# Patient Record
Sex: FEMALE | Race: BLACK OR AFRICAN AMERICAN | NOT HISPANIC OR LATINO | Employment: OTHER | URBAN - METROPOLITAN AREA
[De-identification: names, ages, dates, MRNs, and addresses within clinical notes are randomized per-mention and may not be internally consistent; named-entity substitution may affect disease eponyms.]

---

## 2018-05-17 PROCEDURE — 36415 COLL VENOUS BLD VENIPUNCTURE: CPT

## 2018-05-17 PROCEDURE — 99285 EMERGENCY DEPT VISIT HI MDM: CPT

## 2018-05-18 ENCOUNTER — HOSPITAL ENCOUNTER (INPATIENT)
Facility: HOSPITAL | Age: 53
LOS: 1 days | Discharge: PSYCHIATRIC OR SUBSTANCE USE FACILITY | End: 2018-05-19
Attending: EMERGENCY MEDICINE | Admitting: INTERNAL MEDICINE

## 2018-05-18 ENCOUNTER — APPOINTMENT (OUTPATIENT)
Dept: CT IMAGING | Facility: HOSPITAL | Age: 53
End: 2018-05-18
Attending: HOSPITALIST

## 2018-05-18 DIAGNOSIS — T44.7X2A: Primary | ICD-10-CM

## 2018-05-18 DIAGNOSIS — R45.851 SUICIDAL IDEATION: ICD-10-CM

## 2018-05-18 PROBLEM — I42.9 CARDIOMYOPATHY (HCC): Status: ACTIVE | Noted: 2018-05-18

## 2018-05-18 PROBLEM — F32.A DEPRESSION: Status: ACTIVE | Noted: 2018-05-18

## 2018-05-18 PROBLEM — I10 HYPERTENSION: Status: ACTIVE | Noted: 2018-05-18

## 2018-05-18 PROBLEM — E78.5 HYPERLIPIDEMIA: Status: ACTIVE | Noted: 2018-05-18

## 2018-05-18 LAB
ALBUMIN SERPL-MCNC: 4.3 G/DL (ref 3.5–5.2)
ALBUMIN/GLOB SERPL: 1 G/DL
ALP SERPL-CCNC: 87 U/L (ref 39–117)
ALT SERPL W P-5'-P-CCNC: 9 U/L (ref 1–33)
AMPHET+METHAMPHET UR QL: NEGATIVE
ANION GAP SERPL CALCULATED.3IONS-SCNC: 13.7 MMOL/L
ANION GAP SERPL CALCULATED.3IONS-SCNC: 13.8 MMOL/L
APAP SERPL-MCNC: <5 MCG/ML (ref 10–30)
APTT PPP: 39 SECONDS (ref 22.7–35.4)
APTT PPP: 51.8 SECONDS (ref 22.7–35.4)
AST SERPL-CCNC: 10 U/L (ref 1–32)
BARBITURATES UR QL SCN: NEGATIVE
BASOPHILS # BLD AUTO: 0.01 10*3/MM3 (ref 0–0.2)
BASOPHILS # BLD AUTO: 0.02 10*3/MM3 (ref 0–0.2)
BASOPHILS NFR BLD AUTO: 0.2 % (ref 0–1.5)
BASOPHILS NFR BLD AUTO: 0.3 % (ref 0–1.5)
BENZODIAZ UR QL SCN: NEGATIVE
BILIRUB SERPL-MCNC: 0.8 MG/DL (ref 0.1–1.2)
BUN BLD-MCNC: 17 MG/DL (ref 6–20)
BUN BLD-MCNC: 17 MG/DL (ref 6–20)
BUN/CREAT SERPL: 13.7 (ref 7–25)
BUN/CREAT SERPL: 15 (ref 7–25)
CALCIUM SPEC-SCNC: 9.3 MG/DL (ref 8.6–10.5)
CALCIUM SPEC-SCNC: 9.5 MG/DL (ref 8.6–10.5)
CANNABINOIDS SERPL QL: POSITIVE
CHLORIDE SERPL-SCNC: 103 MMOL/L (ref 98–107)
CHLORIDE SERPL-SCNC: 103 MMOL/L (ref 98–107)
CO2 SERPL-SCNC: 23.3 MMOL/L (ref 22–29)
CO2 SERPL-SCNC: 25.2 MMOL/L (ref 22–29)
COCAINE UR QL: POSITIVE
CREAT BLD-MCNC: 1.13 MG/DL (ref 0.57–1)
CREAT BLD-MCNC: 1.24 MG/DL (ref 0.57–1)
DEPRECATED RDW RBC AUTO: 50.4 FL (ref 37–54)
DEPRECATED RDW RBC AUTO: 50.9 FL (ref 37–54)
EOSINOPHIL # BLD AUTO: 0.24 10*3/MM3 (ref 0–0.7)
EOSINOPHIL # BLD AUTO: 0.33 10*3/MM3 (ref 0–0.7)
EOSINOPHIL NFR BLD AUTO: 3.9 % (ref 0.3–6.2)
EOSINOPHIL NFR BLD AUTO: 4.7 % (ref 0.3–6.2)
ERYTHROCYTE [DISTWIDTH] IN BLOOD BY AUTOMATED COUNT: 16.3 % (ref 11.7–13)
ERYTHROCYTE [DISTWIDTH] IN BLOOD BY AUTOMATED COUNT: 16.3 % (ref 11.7–13)
ETHANOL BLD-MCNC: <10 MG/DL (ref 0–10)
ETHANOL UR QL: <0.01 %
GFR SERPL CREATININE-BSD FRML MDRD: 55 ML/MIN/1.73
GFR SERPL CREATININE-BSD FRML MDRD: 61 ML/MIN/1.73
GLOBULIN UR ELPH-MCNC: 4.2 GM/DL
GLUCOSE BLD-MCNC: 109 MG/DL (ref 65–99)
GLUCOSE BLD-MCNC: 131 MG/DL (ref 65–99)
HCT VFR BLD AUTO: 35.2 % (ref 35.6–45.5)
HCT VFR BLD AUTO: 39.2 % (ref 35.6–45.5)
HCT VFR BLD AUTO: 42.1 % (ref 35.6–45.5)
HGB BLD-MCNC: 11.1 G/DL (ref 11.9–15.5)
HGB BLD-MCNC: 12.4 G/DL (ref 11.9–15.5)
HGB BLD-MCNC: 13.3 G/DL (ref 11.9–15.5)
IMM GRANULOCYTES # BLD: 0 10*3/MM3 (ref 0–0.03)
IMM GRANULOCYTES # BLD: 0.01 10*3/MM3 (ref 0–0.03)
IMM GRANULOCYTES NFR BLD: 0 % (ref 0–0.5)
IMM GRANULOCYTES NFR BLD: 0.1 % (ref 0–0.5)
INR PPP: 3.68 (ref 0.9–1.1)
INR PPP: 5.7 (ref 0.9–1.1)
LYMPHOCYTES # BLD AUTO: 1.93 10*3/MM3 (ref 0.9–4.8)
LYMPHOCYTES # BLD AUTO: 2.13 10*3/MM3 (ref 0.9–4.8)
LYMPHOCYTES NFR BLD AUTO: 27.7 % (ref 19.6–45.3)
LYMPHOCYTES NFR BLD AUTO: 35 % (ref 19.6–45.3)
MCH RBC QN AUTO: 26.7 PG (ref 26.9–32)
MCH RBC QN AUTO: 26.8 PG (ref 26.9–32)
MCHC RBC AUTO-ENTMCNC: 31.6 G/DL (ref 32.4–36.3)
MCHC RBC AUTO-ENTMCNC: 31.6 G/DL (ref 32.4–36.3)
MCV RBC AUTO: 84.3 FL (ref 80.5–98.2)
MCV RBC AUTO: 84.7 FL (ref 80.5–98.2)
METHADONE UR QL SCN: NEGATIVE
MONOCYTES # BLD AUTO: 0.29 10*3/MM3 (ref 0.2–1.2)
MONOCYTES # BLD AUTO: 0.36 10*3/MM3 (ref 0.2–1.2)
MONOCYTES NFR BLD AUTO: 4.8 % (ref 5–12)
MONOCYTES NFR BLD AUTO: 5.2 % (ref 5–12)
NEUTROPHILS # BLD AUTO: 3.41 10*3/MM3 (ref 1.9–8.1)
NEUTROPHILS # BLD AUTO: 4.33 10*3/MM3 (ref 1.9–8.1)
NEUTROPHILS NFR BLD AUTO: 56.1 % (ref 42.7–76)
NEUTROPHILS NFR BLD AUTO: 62.1 % (ref 42.7–76)
NRBC BLD MANUAL-RTO: 0 /100 WBC (ref 0–0)
NRBC BLD MANUAL-RTO: 0 /100 WBC (ref 0–0)
OPIATES UR QL: NEGATIVE
OXYCODONE UR QL SCN: NEGATIVE
PLATELET # BLD AUTO: 225 10*3/MM3 (ref 140–500)
PLATELET # BLD AUTO: 229 10*3/MM3 (ref 140–500)
PMV BLD AUTO: 11.8 FL (ref 6–12)
PMV BLD AUTO: 12.1 FL (ref 6–12)
POTASSIUM BLD-SCNC: 4.1 MMOL/L (ref 3.5–5.2)
POTASSIUM BLD-SCNC: 4.3 MMOL/L (ref 3.5–5.2)
PROT SERPL-MCNC: 8.5 G/DL (ref 6–8.5)
PROTHROMBIN TIME: 36 SECONDS (ref 11.7–14.2)
PROTHROMBIN TIME: 50.7 SECONDS (ref 11.7–14.2)
RBC # BLD AUTO: 4.65 10*6/MM3 (ref 3.9–5.2)
RBC # BLD AUTO: 4.97 10*6/MM3 (ref 3.9–5.2)
SALICYLATES SERPL-MCNC: 0.4 MG/DL
SODIUM BLD-SCNC: 140 MMOL/L (ref 136–145)
SODIUM BLD-SCNC: 142 MMOL/L (ref 136–145)
TROPONIN T SERPL-MCNC: <0.01 NG/ML (ref 0–0.03)
TROPONIN T SERPL-MCNC: <0.01 NG/ML (ref 0–0.03)
WBC NRBC COR # BLD: 6.08 10*3/MM3 (ref 4.5–10.7)
WBC NRBC COR # BLD: 6.97 10*3/MM3 (ref 4.5–10.7)

## 2018-05-18 PROCEDURE — 70450 CT HEAD/BRAIN W/O DYE: CPT

## 2018-05-18 PROCEDURE — 84484 ASSAY OF TROPONIN QUANT: CPT | Performed by: EMERGENCY MEDICINE

## 2018-05-18 PROCEDURE — 93005 ELECTROCARDIOGRAM TRACING: CPT | Performed by: INTERNAL MEDICINE

## 2018-05-18 PROCEDURE — 90791 PSYCH DIAGNOSTIC EVALUATION: CPT | Performed by: SOCIAL WORKER

## 2018-05-18 PROCEDURE — 85014 HEMATOCRIT: CPT | Performed by: HOSPITALIST

## 2018-05-18 PROCEDURE — 80307 DRUG TEST PRSMV CHEM ANLYZR: CPT | Performed by: EMERGENCY MEDICINE

## 2018-05-18 PROCEDURE — 93010 ELECTROCARDIOGRAM REPORT: CPT | Performed by: INTERNAL MEDICINE

## 2018-05-18 PROCEDURE — 93005 ELECTROCARDIOGRAM TRACING: CPT | Performed by: EMERGENCY MEDICINE

## 2018-05-18 PROCEDURE — 25010000002 LORAZEPAM PER 2 MG: Performed by: HOSPITALIST

## 2018-05-18 PROCEDURE — 80053 COMPREHEN METABOLIC PANEL: CPT | Performed by: EMERGENCY MEDICINE

## 2018-05-18 PROCEDURE — 85730 THROMBOPLASTIN TIME PARTIAL: CPT | Performed by: EMERGENCY MEDICINE

## 2018-05-18 PROCEDURE — 84484 ASSAY OF TROPONIN QUANT: CPT | Performed by: HOSPITALIST

## 2018-05-18 PROCEDURE — 85025 COMPLETE CBC W/AUTO DIFF WBC: CPT | Performed by: HOSPITALIST

## 2018-05-18 PROCEDURE — 85610 PROTHROMBIN TIME: CPT | Performed by: HOSPITALIST

## 2018-05-18 PROCEDURE — 85025 COMPLETE CBC W/AUTO DIFF WBC: CPT | Performed by: EMERGENCY MEDICINE

## 2018-05-18 PROCEDURE — 85610 PROTHROMBIN TIME: CPT | Performed by: EMERGENCY MEDICINE

## 2018-05-18 PROCEDURE — 36415 COLL VENOUS BLD VENIPUNCTURE: CPT

## 2018-05-18 PROCEDURE — 85730 THROMBOPLASTIN TIME PARTIAL: CPT | Performed by: HOSPITALIST

## 2018-05-18 PROCEDURE — 85018 HEMOGLOBIN: CPT | Performed by: HOSPITALIST

## 2018-05-18 RX ORDER — ACTIVATED CHARCOAL 25 G
50 SUSPENSION, RECONSTITUTED, ORAL (EA) ORAL ONCE
Status: DISCONTINUED | OUTPATIENT
Start: 2018-05-18 | End: 2018-05-19 | Stop reason: HOSPADM

## 2018-05-18 RX ORDER — QUETIAPINE FUMARATE 100 MG/1
100 TABLET, FILM COATED ORAL NIGHTLY
Status: DISCONTINUED | OUTPATIENT
Start: 2018-05-18 | End: 2018-05-19 | Stop reason: HOSPADM

## 2018-05-18 RX ORDER — SODIUM CHLORIDE 0.9 % (FLUSH) 0.9 %
10 SYRINGE (ML) INJECTION AS NEEDED
Status: DISCONTINUED | OUTPATIENT
Start: 2018-05-18 | End: 2018-05-19 | Stop reason: HOSPADM

## 2018-05-18 RX ORDER — SPIRONOLACTONE 100 MG/1
100 TABLET, FILM COATED ORAL DAILY
Status: ON HOLD | COMMUNITY
End: 2018-05-19

## 2018-05-18 RX ORDER — LISINOPRIL 10 MG/1
10 TABLET ORAL DAILY
Status: ON HOLD | COMMUNITY
End: 2018-05-20

## 2018-05-18 RX ORDER — ACETAMINOPHEN 325 MG/1
650 TABLET ORAL EVERY 4 HOURS PRN
Status: DISCONTINUED | OUTPATIENT
Start: 2018-05-18 | End: 2018-05-19 | Stop reason: HOSPADM

## 2018-05-18 RX ORDER — CARVEDILOL 25 MG/1
25 TABLET ORAL 2 TIMES DAILY WITH MEALS
COMMUNITY

## 2018-05-18 RX ORDER — QUETIAPINE FUMARATE 400 MG/1
400 TABLET, FILM COATED ORAL NIGHTLY
COMMUNITY
End: 2018-05-24 | Stop reason: HOSPADM

## 2018-05-18 RX ORDER — LORAZEPAM 2 MG/ML
0.5 INJECTION INTRAMUSCULAR EVERY 6 HOURS PRN
Status: DISCONTINUED | OUTPATIENT
Start: 2018-05-18 | End: 2018-05-19 | Stop reason: HOSPADM

## 2018-05-18 RX ORDER — SODIUM CHLORIDE 9 MG/ML
75 INJECTION, SOLUTION INTRAVENOUS CONTINUOUS
Status: DISCONTINUED | OUTPATIENT
Start: 2018-05-18 | End: 2018-05-18

## 2018-05-18 RX ORDER — SODIUM CHLORIDE 0.9 % (FLUSH) 0.9 %
1-10 SYRINGE (ML) INJECTION AS NEEDED
Status: DISCONTINUED | OUTPATIENT
Start: 2018-05-18 | End: 2018-05-19 | Stop reason: HOSPADM

## 2018-05-18 RX ORDER — ERGOCALCIFEROL (VITAMIN D2) 10 MCG
400 TABLET ORAL DAILY
COMMUNITY

## 2018-05-18 RX ORDER — LORAZEPAM 0.5 MG/1
0.5 TABLET ORAL EVERY 6 HOURS PRN
Status: DISCONTINUED | OUTPATIENT
Start: 2018-05-18 | End: 2018-05-19 | Stop reason: HOSPADM

## 2018-05-18 RX ORDER — QUETIAPINE FUMARATE 25 MG/1
25 TABLET, FILM COATED ORAL NIGHTLY
Status: DISCONTINUED | OUTPATIENT
Start: 2018-05-18 | End: 2018-05-18

## 2018-05-18 RX ORDER — FUROSEMIDE 20 MG/1
40 TABLET ORAL 2 TIMES DAILY
Status: ON HOLD | COMMUNITY
End: 2018-05-19

## 2018-05-18 RX ADMIN — SODIUM CHLORIDE 75 ML/HR: 9 INJECTION, SOLUTION INTRAVENOUS at 03:38

## 2018-05-18 RX ADMIN — QUETIAPINE FUMARATE 100 MG: 100 TABLET, FILM COATED ORAL at 22:52

## 2018-05-18 RX ADMIN — LORAZEPAM 0.5 MG: 2 INJECTION INTRAMUSCULAR; INTRAVENOUS at 03:47

## 2018-05-18 RX ADMIN — SODIUM CHLORIDE 1000 ML: 9 INJECTION, SOLUTION INTRAVENOUS at 10:50

## 2018-05-18 RX ADMIN — LORAZEPAM 0.5 MG: 0.5 TABLET ORAL at 22:52

## 2018-05-18 NOTE — PLAN OF CARE
Problem: Patient Care Overview  Goal: Plan of Care Review  Outcome: Ongoing (interventions implemented as appropriate)   05/18/18 0650   Coping/Psychosocial   Plan of Care Reviewed With patient   Plan of Care Review   Progress no change   OTHER   Outcome Summary -90SBP, HR 70-80's, pt alert and oriented, cont to need urine specimen, Suicide precautions in place, sitter at BS, Ativan x1 IV for nausea, safety maintained

## 2018-05-18 NOTE — PROGRESS NOTES
Discharge Planning Assessment   [facility]     Patient Name: Corrine Dumont  MRN: [id number]  Today's Date: 5/18/2018    Admit Date: 5/18/2018          Discharge Needs Assessment     Row Name 05/18/18 1410       Living Environment    Lives With child(chelo), adult;child(chelo), dependent;parent(s)    Current Living Arrangements home/apartment/condo    Primary Care Provided by self    Provides Primary Care For no one    Family Caregiver if Needed other (see comments)    Quality of Family Relationships unable to assess    Able to Return to Prior Arrangements other (see comments)       Resource/Environmental Concerns    Resource/Environmental Concerns none    Transportation Concerns car, none       Transition Planning    Patient/Family Anticipates Transition to other (see comments)    Patient/Family Anticipated Services at Transition mental health services    Transportation Anticipated family or friend will provide       Discharge Needs Assessment    Concerns to be Addressed physical/sexual safety;mental health;medication;discharge planning    Equipment Currently Used at Home oxygen;nebulizer    Anticipated Changes Related to Illness none    Equipment Needed After Discharge none            Discharge Plan     Row Name 05/18/18 1431       Plan    Plan Comments CCP met with patient at bedside. CCP role explained and discharge planning discussed.  Face sheet verified noted.  Pt's emergency contact is her daughter Kelly  [phone].   Pt lives in an house with her daughter hand her 5 grandchildren and her father.  She is independent with ADL's.   Pt uses no DME to ambulate.  Patient denies home health history.  Patient has no SNF history.  Patient uses [facility] on [address] for her medications.  Pt has transportation .  Pt states she will go to [facility] [place].  She states if they discharge her she will be right back in here. Nothing is going to get any better.  CCP will follow    Row Name 05/18/18 4590       Plan    Plan  Pt expects transfer to [facility]          Destination     No service coordination in this encounter.      Durable Medical Equipment     No service coordination in this encounter.      Dialysis/Infusion     No service coordination in this encounter.      Home Medical Care     No service coordination in this encounter.      Social Care     No service coordination in this encounter.                Demographic Summary    No documentation.           Functional Status     Row Name 05/18/18 1409       Mental Status Summary    Recent Changes in Mental Status/Cognitive Functioning thought patterns       Employment/    Employment Status disabled            Psychosocial    No documentation.           Abuse/Neglect    No documentation.           Legal    No documentation.           Substance Abuse    No documentation.           Patient Forms    No documentation.         Queenie Lu RN

## 2018-05-18 NOTE — ED NOTES
"Pt refused activated charcoal, stating \"I know what that is and I am not drinking that shit and you are not putting a tube down my nose either\" ED MD aware.       Danika Lu RN  05/18/18 0044    "

## 2018-05-18 NOTE — H&P
HISTORY AND PHYSICAL   [facility]        Patient Identification:  Name: Corrine Dumont  Age: 53 y.o.  Sex: female  :  1965  MRN: [id number]                     Primary Care Physician: VANDANA Kwon    Chief Complaint:  Drug OD and depressed    History of Present Illness:          The patient is a 53-year-old -American female with history of artery myopathy, depression, hypertension, hyperlipidemia and history of myocardial infarction who is admitted with history of being very depressed and having suicidal thoughts.  She took a handful of pills including Coreg Xarelto and Spironalactone.  She also smoked $100 worth of crack.  She'd had a little bit of chest discomfort and had been sick with some nausea and vomiting.  EMS was called and she was brought to the emergency room for further evaluation treatment.  The patient was placed on 72 hour hold and admitted for further evaluation treatment of drug overdose and depression with suicidal thoughts.    Past Medical History:  Past Medical History:   Diagnosis Date   • Cardiomyopathy    • Depression    • Hyperlipidemia    • Hypertension    • Myocardial infarction      Past Surgical History:AICD  History reviewed. No pertinent surgical history.   Home Meds:  Prescriptions Prior to Admission   Medication Sig Dispense Refill Last Dose   • carvedilol (COREG) 12.5 MG tablet Take 25 mg by mouth 2 (Two) Times a Day With Meals.   2018 at Unknown time   • furosemide (LASIX) 20 MG tablet Take 40 mg by mouth 2 (Two) Times a Day.   2018 at Unknown time   • lisinopril (PRINIVIL,ZESTRIL) 10 MG tablet Take 10 mg by mouth Daily.   2018 at Unknown time   • QUEtiapine (SEROquel) 25 MG tablet Take 25 mg by mouth Every Night.   2018 at Unknown time   • rivaroxaban (XARELTO) 10 MG tablet Take 20 mg by mouth Daily.   2018 at Unknown time   • spironolactone (ALDACTONE) 100 MG tablet Take 100 mg by mouth Daily.   2018 at Unknown  time   • Vitamin D, Cholecalciferol, (CHOLECALCIFEROL) 400 units tablet Take 400 Units by mouth Daily.   2018 at Unknown time       Allergies:  Allergies   Allergen Reactions   • Codeine Hives     Immunizations:    There is no immunization history on file for this patient.  Social History:   Social History     Social History Narrative   • No narrative on file     Social History     Social History   • Marital status: Single     Spouse name: N/A   • Number of children: N/A   • Years of education: N/A     Occupational History   • Not on file.     Social History Main Topics   • Smoking status: Current Every Day Smoker   • Smokeless tobacco: Not on file   • Alcohol use Yes   • Drug use: Yes   • Sexual activity: Yes     Other Topics Concern   • Not on file     Social History Narrative   • No narrative on file       Family History:  Family History   Problem Relation Age of Onset   • Heart failure Mother    • Heart failure Father         Review of Systems  See history of present illness and past medical history.  Patient denies headache, dizziness, syncope, falls, trauma, change in vision, change in hearing, change in taste, changes in weight, changes in appetite, focal weakness, numbness, or paresthesia.  Patient denies  palpitations, dyspnea, orthopnea, PND, cough, sinus pressure, rhinorrhea, epistaxis, hemoptysis, hematemesis, diarrhea, constipation or hematchezia.  Denies cold or heat intolerance, polydipsia, polyuria, polyphagia. Denies hematuria, pyuria, dysuria, hesitancy, frequency or urgency.   Denies fever, chills, sweats, night sweats.  Denies missing any routine medications. Remainder of ROS is negative.    Objective:  tMax 24 hrs: Temp (24hrs), Av.3 °F (37.4 °C), Min:99.3 °F (37.4 °C), Max:99.3 °F (37.4 °C)    Vitals Ranges:   Temp:  [99.3 °F (37.4 °C)] 99.3 °F (37.4 °C)  Heart Rate:  [75-88] 75  Resp:  [16-18] 18  BP: ()/() 97/67      Exam:  BP 97/67 (BP Location: Right arm, Patient  "Position: Lying)   Pulse 75   Temp 99.3 °F (37.4 °C) (Oral)   Resp 18   Ht 172.7 cm (68\")   Wt 109 kg (240 lb)   SpO2 100%   BMI 36.49 kg/m²     General Appearance:    Alert, cooperative, no distress, appears stated age   Head:    Normocephalic, without obvious abnormality, atraumatic   Eyes:    PERRL, conjunctiva/corneas clear, EOM's intact, both eyes   Ears:    Normal external ear canals, both ears   Nose:   Nares normal, septum midline, mucosa normal, no drainage    or sinus tenderness   Throat:   Lips, mucosa, and tongue normal   Neck:   Supple, symmetrical, trachea midline, no adenopathy;     thyroid:  no enlargement/tenderness/nodules; no carotid    bruit or JVD   Back:     Symmetric, no curvature, ROM normal, no CVA tenderness   Lungs:     Clear to auscultation bilaterally, respirations unlabored   Chest Wall:    No tenderness or deformity    Heart:    Regular rate and rhythm, S1 and S2 normal, no murmur, rub   or gallop   Abdomen:     Soft, non-tender, bowel sounds active all four quadrants,     no masses, no hepatomegaly, no splenomegaly   Extremities:   Extremities normal, atraumatic, no cyanosis or edema   Pulses:   2+ and symmetric all extremities   Skin:   Skin color, texture, turgor normal, no rashes or lesions   Lymph nodes:   Cervical, supraclavicular, and axillary nodes normal   Neurologic:   CNII-XII intact, normal strength, sensation intact throughout      .    Data Review:  Lab Results (last 72 hours)     Procedure Component Value Units Date/Time    Comprehensive Metabolic Panel [[id number]]  (Abnormal) Collected:  05/18/18 0012    Specimen:  Blood Updated:  05/18/18 0038     Glucose 109 (H) mg/dL      BUN 17 mg/dL      Creatinine 1.24 (H) mg/dL      Sodium 142 mmol/L      Potassium 4.3 mmol/L      Chloride 103 mmol/L      CO2 25.2 mmol/L      Calcium 9.5 mg/dL      Total Protein 8.5 g/dL      Albumin 4.30 g/dL      ALT (SGPT) 9 U/L      AST (SGOT) 10 U/L      Alkaline Phosphatase 87 U/L  "     Total Bilirubin 0.8 mg/dL      eGFR  African Amer 55 (L) mL/min/1.73      Globulin 4.2 gm/dL      A/G Ratio 1.0 g/dL      BUN/Creatinine Ratio 13.7     Anion Gap 13.8 mmol/L     Salicylate Level [[id number]] Collected:  05/18/18 0012    Specimen:  Blood Updated:  05/18/18 0038     Salicylate 0.4 mg/dL     Narrative:       Therapeutic range for Salicylates:  3.0 - 10.0 mg/dL for antipyretic/analgesic conditions  15.0 - 30.0 mg/dL for anti-inflammatory conditions    Troponin [[id number]]  (Normal) Collected:  05/18/18 0012    Specimen:  Blood Updated:  05/18/18 0038     Troponin T <0.010 ng/mL     Narrative:       Troponin T Reference Ranges:  Less than 0.03 ng/mL:    Negative for AMI  0.03 to 0.09 ng/mL:      Indeterminant for AMI  Greater than 0.09 ng/mL: Positive for AMI    Acetaminophen Level [[id number]]  (Abnormal) Collected:  05/18/18 0012    Specimen:  Blood Updated:  05/18/18 0038     Acetaminophen <5.0 (L) mcg/mL     Ethanol [[id number]] Collected:  05/18/18 0012    Specimen:  Blood Updated:  05/18/18 0038     Ethanol <10 mg/dL      Ethanol % <0.010 %     Protime-INR [[id number]]  (Abnormal) Collected:  05/18/18 0012    Specimen:  Blood Updated:  05/18/18 0035     Protime 36.0 (H) Seconds      INR 3.68 (H)    aPTT [[id number]]  (Abnormal) Collected:  05/18/18 0012    Specimen:  Blood Updated:  05/18/18 0035     PTT 39.0 (H) seconds     CBC & Differential [[id number]] Collected:  05/18/18 0012    Specimen:  Blood Updated:  05/18/18 0034    Narrative:       The following orders were created for panel order CBC & Differential.  Procedure                               Abnormality         Status                     ---------                               -----------         ------                     Scan Slide[[id number]]                                                                  CBC Auto Differential[[id number]]        Abnormal            Final result                 Please view results for these tests on the  individual orders.    CBC Auto Differential [[id number]]  (Abnormal) Collected:  05/18/18 0012    Specimen:  Blood Updated:  05/18/18 0034     WBC 6.97 10*3/mm3      RBC 4.97 10*6/mm3      Hemoglobin 13.3 g/dL      Hematocrit 42.1 %      MCV 84.7 fL      MCH 26.8 (L) pg      MCHC 31.6 (L) g/dL      RDW 16.3 (H) %      RDW-SD 50.4 fl      MPV 11.8 fL      Platelets 225 10*3/mm3      Neutrophil % 62.1 %      Lymphocyte % 27.7 %      Monocyte % 5.2 %      Eosinophil % 4.7 %      Basophil % 0.3 %      Immature Grans % 0.1 %      Neutrophils, Absolute 4.33 10*3/mm3      Lymphocytes, Absolute 1.93 10*3/mm3      Monocytes, Absolute 0.36 10*3/mm3      Eosinophils, Absolute 0.33 10*3/mm3      Basophils, Absolute 0.02 10*3/mm3      Immature Grans, Absolute 0.01 10*3/mm3      nRBC 0.0 /100 WBC                    Imaging Results (all)     None        Patient Active Problem List   Diagnosis Code   • Intentional overdose of beta-adrenergic blocking drug T44.7X2A   • Hypertension I10   • Hyperlipidemia E78.5   • Depression F32.9   • Cardiomyopathy I42.9   • Suicidal ideation R45.851       Assessment:  Active Hospital Problems (** Indicates Principal Problem)    Diagnosis Date Noted   • **Intentional overdose of beta-adrenergic blocking drug [T44.7X2A] 05/18/2018   • Hypertension [I10] 05/18/2018   • Hyperlipidemia [E78.5] 05/18/2018   • Depression [F32.9] 05/18/2018   • Cardiomyopathy [I42.9] 05/18/2018   • Suicidal ideation [R45.851] 05/18/2018      Resolved Hospital Problems    Diagnosis Date Noted Date Resolved   No resolved problems to display.       Plan:  The patient's admitted the hospital and will hold most of her medicines for now.  She is on 72 hour hold and we will consult access and psychiatry.  She'll be on a monitored bed with suicide precautions and will give some IV fluid.    Mayco Yun MD  5/18/2018  4:09 AM

## 2018-05-18 NOTE — NURSING NOTE
Pt's purse in pt belongings bag in [place] office. Security notified to come get pt's purse.  Contents not checked by RN. Per pt no valuables in purse but wallet with ID and social security card in purse. Nightgown and undergarments in [place] in pt belongings bag.

## 2018-05-18 NOTE — ED NOTES
"Pt to Room 03 via [facility] with c/o suicide attempt occurring this evening around 2200.  Per EMS, patient ingested around 30-40 pills, that were a mixture of Xarelto, Carvedilol and Aldactone.  Pt states that she was attempting to harm herself and that \"don't worry, next time I will get it right.\"  Pt does appear tearful in nature, states that she doesn't wish to talk about it at this time.  Upon arrival of Dr. Keane, patient states that she is dying because she has cardiomyopathy and has an ejection fraction of 20%.  SI precautions in place at this time.  Pt states that she also \"smoked $100.00 worth of crack.\"       Meagan Shin RN  05/18/18 0020    "

## 2018-05-18 NOTE — ED NOTES
Pt appears to be resting at this time.  JUSTIN Barney at bedside for 72 hour hold.  Pt is alert and oriented, chest rise and fall is equal in expansion.  Pt appears stable at this time.  Will continue to monitor.      Meagan Shin RN  05/18/18 0102

## 2018-05-18 NOTE — PROGRESS NOTES
Name: Corrine Dumont ADMIT: 2018   : 1965  PCP: VANDANA Kwon    MRN: [id number] LOS: 0 days   AGE/SEX: 53 y.o. female    ROOM: [facility]   Subjective   Patient is more awake and alert but still continues to have hypertension.  Her heart rate is normal.  She is fluid responsive.  She also has a history of cardiomyopathy with ejection fraction of 30%.  However her troponins are within normal range.  She has used to progress along with cocaine.  This is a suicidal attempt and access Center to be seeing her.    Brief hospital course since admission:  Nonischemic cardiomyopathy  Hx hypertension  Depression  Drug abuse    I have reviewed past medical history, social hisotory, family history, allergies.  No changes from admission note.      Review of Systems   Constitutional: Negative for chills, fatigue and fever.   HENT: Negative for congestion.    Respiratory: Negative for shortness of breath.    Cardiovascular: Negative for chest pain and leg swelling.   Gastrointestinal: Negative for diarrhea and vomiting.   Neurological: Positive for seizures. Negative for dizziness and headaches.   Psychiatric/Behavioral: Negative for confusion.          Objective   Vital Signs  Temp:  [98 °F (36.7 °C)-99.3 °F (37.4 °C)] 98 °F (36.7 °C)  Heart Rate:  [66-88] 70  Resp:  [16-18] 18  BP: ()/() 94/66  SpO2:  [97 %-100 %] 99 %  on  Flow (L/min):  [2] 2;   Device (Oxygen Therapy): nasal cannula  Body mass index is 36.49 kg/m².    Intake/Output Summary (Last 24 hours) at 18 1133  Last data filed at 18 0830   Gross per 24 hour   Intake             1480 ml   Output                0 ml   Net             1480 ml       Physical Exam   Constitutional: She is oriented to person, place, and time. She appears well-developed and well-nourished.   HENT:   Head: Atraumatic.   Eyes: Pupils are equal, round, and reactive to light. No scleral icterus.   Cardiovascular: Normal rate and regular rhythm.     Pulmonary/Chest: No accessory muscle usage. No respiratory distress. She has no decreased breath sounds. She has no wheezes.   Abdominal: Soft. Normal appearance and bowel sounds are normal. She exhibits no ascites. There is no hepatosplenomegaly.   Neurological: She is alert and oriented to person, place, and time.   Skin: No laceration and no petechiae noted. No cyanosis. Nails show no clubbing.   Psychiatric: She has a normal mood and affect. Her behavior is normal.   Vitals reviewed.      Results Review:      Results from last 7 days  Lab Units 05/18/18  0332 05/18/18  0012   WBC 10*3/mm3 6.08 6.97   HEMOGLOBIN g/dL 12.4 13.3   HEMATOCRIT % 39.2 42.1   PLATELETS 10*3/mm3 229 225       Results from last 7 days  Lab Units 05/18/18  0332 05/18/18  0012   SODIUM mmol/L 140 142   POTASSIUM mmol/L 4.1 4.3   CHLORIDE mmol/L 103 103   CO2 mmol/L 23.3 25.2   BUN mg/dL 17 17   CREATININE mg/dL 1.13* 1.24*   GLUCOSE mg/dL 131* 109*   CALCIUM mg/dL 9.3 9.5       Results from last 7 days  Lab Units 05/18/18  0405 05/18/18  0012   INR  5.70* 3.68*     Hemoglobin A1C:No results found for: HGBA1C  Glucose Range:No results found for: POCGLU      EZ ALEC 50 g Oral Once   QUEtiapine 25 mg Oral Nightly   sodium chloride 1,000 mL Intravenous Once       sodium chloride 75 mL/hr Last Rate: 75 mL/hr (05/18/18 0338)   Diet Regular; Cardiac; Safe Tray  Assessment/Plan       Assessment/Plan      Active Hospital Problems (** Indicates Principal Problem)    Diagnosis Date Noted   • **Intentional overdose of beta-adrenergic blocking drug [T44.7X2A] 05/18/2018   • Hypertension [I10] 05/18/2018   • Hyperlipidemia [E78.5] 05/18/2018   • Depression [F32.9] 05/18/2018   • Cardiomyopathy [I42.9] 05/18/2018   • Suicidal ideation [R45.851] 05/18/2018      Resolved Hospital Problems    Diagnosis Date Noted Date Resolved   No resolved problems to display.     Patient has no bradycardia  Hypotensive, but  fluid responsive.  I have given her fluid  bolus and she will be on a maintenance fluid and watch her carefully as she has ejection fraction around 30% in 2015 due to nonischemic cardiomyopathy most likely induced by drug abuse.  Her troponins are negative.    Continue sitter and access Center to see her          Errol Rodriguez MD  [facility]  05/18/18

## 2018-05-18 NOTE — ED PROVIDER NOTES
" EMERGENCY DEPARTMENT ENCOUNTER    CHIEF COMPLAINT  Chief Complaint: Drug overdose   History given by: patient, EMS  History limited by: n/a  Room Number: 427/1  PMD: VANDANA Kwon Dr PMD  Dr Maki, cardiology    HPI:  Pt is a 53 y.o. female who presents after an intentional overdose tonight at about 22:00. Pt states that she took a couple of handfuls of Carvedilol, Xarelto, and Spironolactone. Pt also admits to smoking \"100 dollars worth of crack\" prior to overdosing. Pt states that a family member called EMS. EMS reports that the pt left a note. Currently, pt complains of chest pressure.     Duration:  2 hours   Onset: graudal  Timing: constant   Location: psych  Radiation: n/a  Quality: intentional overdose   Intensity/Severity: moderate  Progression: unchanged   Associated Symptoms: chest pain  Aggravating Factors: none  Alleviating Factors: none    PAST MEDICAL HISTORY  Active Ambulatory Problems     Diagnosis Date Noted   • No Active Ambulatory Problems     Resolved Ambulatory Problems     Diagnosis Date Noted   • No Resolved Ambulatory Problems     Past Medical History:   Diagnosis Date   • Cardiomyopathy    • Depression    • Hyperlipidemia    • Hypertension    • Myocardial infarction        PAST SURGICAL HISTORY  History reviewed. No pertinent surgical history.    FAMILY HISTORY  Family History   Problem Relation Age of Onset   • Heart failure Mother    • Heart failure Father        SOCIAL HISTORY  Social History     Social History   • Marital status: Single     Spouse name: N/A   • Number of children: N/A   • Years of education: N/A     Occupational History   • Not on file.     Social History Main Topics   • Smoking status: Current Every Day Smoker   • Smokeless tobacco: Not on file   • Alcohol use Yes   • Drug use: Yes   • Sexual activity: Yes     Other Topics Concern   • Not on file     Social History Narrative   • No narrative on file       ALLERGIES  Codeine    REVIEW OF " SYSTEMS  Review of Systems   Constitutional: Negative for fever.   HENT: Negative for sore throat.    Eyes: Negative.    Respiratory: Negative for cough and shortness of breath.    Cardiovascular: Positive for chest pain.   Gastrointestinal: Negative for abdominal pain, diarrhea and vomiting.   Genitourinary: Negative for dysuria.   Musculoskeletal: Negative for neck pain.   Skin: Negative for rash.   Allergic/Immunologic: Negative.    Neurological: Negative for weakness, numbness and headaches.   Hematological: Negative.    Psychiatric/Behavioral: Positive for suicidal ideas.   All other systems reviewed and are negative.      PHYSICAL EXAM  ED Triage Vitals   Temp Heart Rate Resp BP SpO2   05/18/18 0002 05/18/18 0002 05/18/18 0002 05/18/18 0003 05/18/18 0002   99.3 °F (37.4 °C) 86 16 120/100 100 %      Temp src Heart Rate Source Patient Position BP Location FiO2 (%)   05/18/18 0002 -- -- -- --   Oral           Physical Exam   Constitutional: She is oriented to person, place, and time and well-developed, well-nourished, and in no distress. No distress.   HENT:   Head: Normocephalic and atraumatic.   Eyes: EOM are normal. Pupils are equal, round, and reactive to light.   Neck: Normal range of motion. Neck supple.   Cardiovascular: Normal rate, regular rhythm and normal heart sounds.    Pulmonary/Chest: Effort normal and breath sounds normal. No respiratory distress.   Abdominal: Soft. There is no tenderness. There is no rebound and no guarding.   Musculoskeletal: Normal range of motion. She exhibits no edema.   Neurological: She is alert and oriented to person, place, and time. She has normal sensation and normal strength.   Skin: Skin is warm and dry. No rash noted.   Psychiatric: Affect normal. She expresses suicidal ideation. She expresses suicidal plans.   Nursing note and vitals reviewed.      LAB RESULTS  Lab Results (last 24 hours)     Procedure Component Value Units Date/Time    CBC & Differential  [[id number]] Collected:  05/18/18 0012    Specimen:  Blood Updated:  05/18/18 0034    Narrative:       The following orders were created for panel order CBC & Differential.  Procedure                               Abnormality         Status                     ---------                               -----------         ------                     Scan Slide[[id number]]                                                                  CBC Auto Differential[[id number]]        Abnormal            Final result                 Please view results for these tests on the individual orders.    Comprehensive Metabolic Panel [[id number]]  (Abnormal) Collected:  05/18/18 0012    Specimen:  Blood Updated:  05/18/18 0038     Glucose 109 (H) mg/dL      BUN 17 mg/dL      Creatinine 1.24 (H) mg/dL      Sodium 142 mmol/L      Potassium 4.3 mmol/L      Chloride 103 mmol/L      CO2 25.2 mmol/L      Calcium 9.5 mg/dL      Total Protein 8.5 g/dL      Albumin 4.30 g/dL      ALT (SGPT) 9 U/L      AST (SGOT) 10 U/L      Alkaline Phosphatase 87 U/L      Total Bilirubin 0.8 mg/dL      eGFR  African Amer 55 (L) mL/min/1.73      Globulin 4.2 gm/dL      A/G Ratio 1.0 g/dL      BUN/Creatinine Ratio 13.7     Anion Gap 13.8 mmol/L     Protime-INR [[id number]]  (Abnormal) Collected:  05/18/18 0012    Specimen:  Blood Updated:  05/18/18 0035     Protime 36.0 (H) Seconds      INR 3.68 (H)    aPTT [[id number]]  (Abnormal) Collected:  05/18/18 0012    Specimen:  Blood Updated:  05/18/18 0035     PTT 39.0 (H) seconds     Troponin [[id number]]  (Normal) Collected:  05/18/18 0012    Specimen:  Blood Updated:  05/18/18 0038     Troponin T <0.010 ng/mL     Narrative:       Troponin T Reference Ranges:  Less than 0.03 ng/mL:    Negative for AMI  0.03 to 0.09 ng/mL:      Indeterminant for AMI  Greater than 0.09 ng/mL: Positive for AMI    Acetaminophen Level [[id number]]  (Abnormal) Collected:  05/18/18 0012    Specimen:  Blood Updated:  05/18/18 0038     Acetaminophen  <5.0 (L) mcg/mL     Ethanol [[id number]] Collected:  05/18/18 0012    Specimen:  Blood Updated:  05/18/18 0038     Ethanol <10 mg/dL      Ethanol % <0.010 %     Salicylate Level [[id number]] Collected:  05/18/18 0012    Specimen:  Blood Updated:  05/18/18 0038     Salicylate 0.4 mg/dL     Narrative:       Therapeutic range for Salicylates:  3.0 - 10.0 mg/dL for antipyretic/analgesic conditions  15.0 - 30.0 mg/dL for anti-inflammatory conditions    CBC Auto Differential [[id number]]  (Abnormal) Collected:  05/18/18 0012    Specimen:  Blood Updated:  05/18/18 0034     WBC 6.97 10*3/mm3      RBC 4.97 10*6/mm3      Hemoglobin 13.3 g/dL      Hematocrit 42.1 %      MCV 84.7 fL      MCH 26.8 (L) pg      MCHC 31.6 (L) g/dL      RDW 16.3 (H) %      RDW-SD 50.4 fl      MPV 11.8 fL      Platelets 225 10*3/mm3      Neutrophil % 62.1 %      Lymphocyte % 27.7 %      Monocyte % 5.2 %      Eosinophil % 4.7 %      Basophil % 0.3 %      Immature Grans % 0.1 %      Neutrophils, Absolute 4.33 10*3/mm3      Lymphocytes, Absolute 1.93 10*3/mm3      Monocytes, Absolute 0.36 10*3/mm3      Eosinophils, Absolute 0.33 10*3/mm3      Basophils, Absolute 0.02 10*3/mm3      Immature Grans, Absolute 0.01 10*3/mm3      nRBC 0.0 /100 WBC     Basic Metabolic Panel [[id number]]  (Abnormal) Collected:  05/18/18 0332    Specimen:  Blood Updated:  05/18/18 0442     Glucose 131 (H) mg/dL      BUN 17 mg/dL      Creatinine 1.13 (H) mg/dL      Sodium 140 mmol/L      Potassium 4.1 mmol/L      Chloride 103 mmol/L      CO2 23.3 mmol/L      Calcium 9.3 mg/dL      eGFR   Amer 61 mL/min/1.73      BUN/Creatinine Ratio 15.0     Anion Gap 13.7 mmol/L     Narrative:       GFR Normal >60  Chronic Kidney Disease <60  Kidney Failure <15    CBC Auto Differential [[id number]]  (Abnormal) Collected:  05/18/18 0332    Specimen:  Blood Updated:  05/18/18 0435     WBC 6.08 10*3/mm3      RBC 4.65 10*6/mm3      Hemoglobin 12.4 g/dL      Hematocrit 39.2 %      MCV 84.3 fL       MCH 26.7 (L) pg      MCHC 31.6 (L) g/dL      RDW 16.3 (H) %      RDW-SD 50.9 fl      MPV 12.1 (H) fL      Platelets 229 10*3/mm3      Neutrophil % 56.1 %      Lymphocyte % 35.0 %      Monocyte % 4.8 (L) %      Eosinophil % 3.9 %      Basophil % 0.2 %      Immature Grans % 0.0 %      Neutrophils, Absolute 3.41 10*3/mm3      Lymphocytes, Absolute 2.13 10*3/mm3      Monocytes, Absolute 0.29 10*3/mm3      Eosinophils, Absolute 0.24 10*3/mm3      Basophils, Absolute 0.01 10*3/mm3      Immature Grans, Absolute 0.00 10*3/mm3      nRBC 0.0 /100 WBC     Troponin [[id number]]  (Normal) Collected:  05/18/18 0332    Specimen:  Blood Updated:  05/18/18 0444     Troponin T <0.010 ng/mL     Narrative:       Troponin T Reference Ranges:  Less than 0.03 ng/mL:    Negative for AMI  0.03 to 0.09 ng/mL:      Indeterminant for AMI  Greater than 0.09 ng/mL: Positive for AMI    Protime-INR [[id number]]  (Abnormal) Collected:  05/18/18 0405    Specimen:  Blood Updated:  05/18/18 0459     Protime 50.7 (C) Seconds      INR 5.70 (C)    aPTT [[id number]]  (Abnormal) Collected:  05/18/18 0405    Specimen:  Blood Updated:  05/18/18 0459     PTT 51.8 (H) seconds           I ordered the above labs and reviewed the results    PROCEDURES  Critical Care  Performed by: CEE LARSEN  Authorized by: CEE LARSEN     Critical care provider statement:     Critical care time (minutes):  30    Critical care time was exclusive of:  Separately billable procedures and treating other patients and teaching time    Critical care was necessary to treat or prevent imminent or life-threatening deterioration of the following conditions:  Toxidrome    Critical care was time spent personally by me on the following activities:  Discussions with consultants, development of treatment plan with patient or surrogate, examination of patient, evaluation of patient's response to treatment, obtaining history from patient or surrogate, ordering and performing  treatments and interventions, ordering and review of laboratory studies and re-evaluation of patient's condition          EKG           EKG time: 00:31  Rhythm/Rate: NSR 83  PVC's present  P waves and MS: nml  QRS, axis: nml   ST and T waves: nml     Interpreted Contemporaneously by me, independently viewed  No prior     PROGRESS AND CONSULTS       00:00  EKG, CMP, CBC, PT/INR, troponin, acetaminophen level, EtOH, UDS, and salicylate level ordered.     00:10  BP- 120/100 HR- 86 Temp- 99.3 °F (37.4 °C) (Oral) O2 sat- 100%  Advised pt of the plan for labs. Pt understands and agrees with the plan, all questions answered.    00:14  Spoke with poison control who recommends charcoal. Aldactone can cause electrolyte abnormalities. Monitor mental status, HR, and BP. Treatment of choice is Glucagon if pt becomes symptomatic     00:15  Charcoal ordered.     00:39  Pt awake, alert, and oriented. Pt is refusing charcoal. Will continue to monitor.     01:29  Call placed to [facility] for admission.    01:33  Discussed pt's case with Dr Yun ([facility]), who agrees to admit the pt.    01:36  BP- 92/68 HR- 87 Temp- 99.3 °F (37.4 °C) (Oral) O2 sat- 99%  Rechecked the patient who is in NAD and is resting comfortably. Pt c/o nausea. Informed her of the plan for admission for observation. Will treat nausea. Pt understands and agrees with the plan, all questions answered.    MEDICAL DECISION MAKING  Results were reviewed/discussed with the patient and they were also made aware of online access. Pt also made aware that some labs, such as cultures, will not be resulted during ER visit and follow up with PMD is necessary.     MDM  Number of Diagnoses or Management Options  Intentional overdose of beta-adrenergic blocking drug, initial encounter:   Suicidal ideation:      Amount and/or Complexity of Data Reviewed  Clinical lab tests: ordered and reviewed (Acetaminophen <5.0)  Tests in the medicine section of CPT®: ordered and reviewed (See EKG  procedure note. )  Discuss the patient with other providers: yes (Dr Yun, [facility])    Critical Care  Total time providing critical care: 30-74 minutes    Patient Progress  Patient progress: stable         DIAGNOSIS  Final diagnoses:   Intentional overdose of beta-adrenergic blocking drug, initial encounter   Suicidal ideation       DISPOSITION  ADMISSION    Discussed treatment plan and reason for admission with pt/family and admitting physician.  Pt/family voiced understanding of the plan for admission for further testing/treatment as needed.     Latest Documented Vital Signs:  As of 6:42 AM  BP- 104/62 HR- 76 Temp- 99.3 °F (37.4 °C) (Oral) O2 sat- 97%    --  Documentation assistance provided by miesha Enriquez for Dr Keane.  Information recorded by the miesha was done at my direction and has been verified and validated by me.        Angie Enriquez  05/18/18 0136       Angie Enriquez  05/18/18 0138       Valentin Keane MD  05/18/18 0699

## 2018-05-18 NOTE — CONSULTS
"[facility] evaluated pt. Pt tearful most of interview. Pt states she has a dx of Bipolar Disorder from \"yrs ago.\" Pt states she use to take Lithium but it was having a toxic affect on her. She states it worked for many years for her. She was taking something else as a replacement but could not remember what it was. Pt states she is taking 400mg of Seroquel for sleep and depression. Pt states she had a pulmonary embolism a few years ago and was in skilled nursing facility. Pt states she has heart issues. Pt states she smoked $700 worth of crack cocaine last month because she knew it would\"tear\" her heart up. Pt states she was 6-7 months sober from crack after she had heart issues. Pt states she took an OD of pills last madrid along with smoking $100 worth of crack cocaine to kill herself.Pt continues to feel suicidal and hopeless. She feels she will never get better. Pt states she has been in [facility] and [facility] psychiatric unit in the past. She states she could not remember when. Pt states she is living with her daughter and 5 grandchildren, as well as her 83 yr old father who has dementia. Pt states she cares for her father and the 3 yr old while her daughter is at work. Pt states she feels \"overwhelmed\" by it. Pt states she and her daughter don't get along often times. She states her daughter \"cusses\" her out.Pt states she use to go tho [facility] and saw an outpt counselor. Pt states she hears someone whispering in her ear and has for years. She states her medicine used to help stop the whispering. Pt states her sleep is poor unless she is taking her Seroquel. Pt states her appetite is \"ok.\"  Pt states she has been  since 1984. She had another relationship for 7 yrs that ended last year. Pt states she has some good friends that are supportive. Pt states she has a son she had to kick out of her house. Pt states she likes to watch TV and \"loves to read\" but is having a hard time " concentrating. Plan is for pt to be admitted to [facility] when she is medically stable.

## 2018-05-19 ENCOUNTER — HOSPITAL ENCOUNTER (INPATIENT)
Facility: HOSPITAL | Age: 53
LOS: 5 days | Discharge: HOME | End: 2018-05-24
Attending: SPECIALIST | Admitting: SPECIALIST

## 2018-05-19 VITALS
BODY MASS INDEX: 36.37 KG/M2 | WEIGHT: 240 LBS | RESPIRATION RATE: 16 BRPM | OXYGEN SATURATION: 97 % | SYSTOLIC BLOOD PRESSURE: 100 MMHG | HEART RATE: 84 BPM | DIASTOLIC BLOOD PRESSURE: 70 MMHG | TEMPERATURE: 98 F | HEIGHT: 68 IN

## 2018-05-19 PROBLEM — F32.A DEPRESSION WITH SUICIDAL IDEATION: Status: ACTIVE | Noted: 2018-05-19

## 2018-05-19 PROBLEM — R45.851 DEPRESSION WITH SUICIDAL IDEATION: Status: ACTIVE | Noted: 2018-05-19

## 2018-05-19 LAB
HCT VFR BLD AUTO: 36.1 % (ref 35.6–45.5)
HCT VFR BLD AUTO: 36.5 % (ref 35.6–45.5)
HCT VFR BLD AUTO: 37.4 % (ref 35.6–45.5)
HGB BLD-MCNC: 11.2 G/DL (ref 11.9–15.5)
HGB BLD-MCNC: 11.4 G/DL (ref 11.9–15.5)
HGB BLD-MCNC: 11.5 G/DL (ref 11.9–15.5)
INR PPP: 1.31 (ref 0.9–1.1)
PROTHROMBIN TIME: 16 SECONDS (ref 11.7–14.2)

## 2018-05-19 PROCEDURE — 85018 HEMOGLOBIN: CPT | Performed by: HOSPITALIST

## 2018-05-19 PROCEDURE — 85014 HEMATOCRIT: CPT | Performed by: HOSPITALIST

## 2018-05-19 PROCEDURE — 85610 PROTHROMBIN TIME: CPT | Performed by: HOSPITALIST

## 2018-05-19 RX ORDER — SPIRONOLACTONE 100 MG/1
100 TABLET, FILM COATED ORAL DAILY
Status: COMPLETED | OUTPATIENT
Start: 2018-05-20 | End: 2018-05-20

## 2018-05-19 RX ORDER — CARVEDILOL 25 MG/1
25 TABLET ORAL 2 TIMES DAILY WITH MEALS
Status: COMPLETED | OUTPATIENT
Start: 2018-05-20 | End: 2018-05-20

## 2018-05-19 RX ORDER — FUROSEMIDE 40 MG/1
40 TABLET ORAL DAILY
Status: COMPLETED | OUTPATIENT
Start: 2018-05-20 | End: 2018-05-20

## 2018-05-19 RX ORDER — LISINOPRIL 10 MG/1
10 TABLET ORAL
Status: DISCONTINUED | OUTPATIENT
Start: 2018-05-19 | End: 2018-05-20

## 2018-05-19 RX ORDER — QUETIAPINE FUMARATE 200 MG/1
400 TABLET, FILM COATED ORAL NIGHTLY
Status: COMPLETED | OUTPATIENT
Start: 2018-05-19 | End: 2018-05-19

## 2018-05-19 RX ORDER — SPIRONOLACTONE 100 MG/1
100 TABLET, FILM COATED ORAL DAILY
Start: 2018-05-20

## 2018-05-19 RX ORDER — ACETAMINOPHEN 325 MG/1
650 TABLET ORAL EVERY 4 HOURS PRN
Status: DISCONTINUED | OUTPATIENT
Start: 2018-05-19 | End: 2018-05-24 | Stop reason: HOSPADM

## 2018-05-19 RX ORDER — ALUMINA, MAGNESIA, AND SIMETHICONE 2400; 2400; 240 MG/30ML; MG/30ML; MG/30ML
15 SUSPENSION ORAL EVERY 6 HOURS PRN
Status: DISCONTINUED | OUTPATIENT
Start: 2018-05-19 | End: 2018-05-24 | Stop reason: HOSPADM

## 2018-05-19 RX ORDER — FUROSEMIDE 20 MG/1
40 TABLET ORAL DAILY
Start: 2018-05-20

## 2018-05-19 RX ADMIN — RIVAROXABAN 20 MG: 20 TABLET, FILM COATED ORAL at 21:28

## 2018-05-19 RX ADMIN — QUETIAPINE FUMARATE 400 MG: 200 TABLET, FILM COATED ORAL at 21:26

## 2018-05-19 NOTE — PLAN OF CARE
Problem: Patient Care Overview  Goal: Plan of Care Review  Outcome: Ongoing (interventions implemented as appropriate)   05/19/18 0124   Coping/Psychosocial   Plan of Care Reviewed With patient   Plan of Care Review   Progress no change   OTHER   Outcome Summary Suicide precautions in place, VSS, Poison control called x2, no signs of bleeding, cont to monitor labs, edi po well, safety maintained

## 2018-05-19 NOTE — NURSING NOTE
"Pt arrived on unit at 1850.  She was tearful upon admission.  Reported to this nurse \"there's no hope for me\".  Stated she didn't want to take any meds until this nurse asked her about seroquel and she said \"I'll take that\".  She stated \"I just want to stay in here and sleep\".  Pt also reports she is on 2L of O2 at home when she's in bed.  Will give report to alber Lanier RN and continue to monitor for safety and any change in condition.  "

## 2018-05-19 NOTE — NURSING NOTE
"Orders received from Dr Ramos for admit to [facility].  Seroquel 400 mg at HS verified by [facility] pharmacy, order for seroquel 400 mg HS obtained by Dr Ramos.  Pt remains on 72 hr hold.  Report from Conchita BROWN on 4N: pt is maintaining 100% O2 saturation on room air, walking independently, continent of bowel. Bladder, no skin issues.  Pt has been on phone on [facility] arguing with her son, stating her family doesn't care about her.  Report given to Vanessa BROWN on [facility].      19:09 Pt arrived to [facility], tearful easily, stating she only wants to stay in bed, states \"groups don't help me.\"  Pt stating on oxygen at night 2L NC x 3 years at home.   Went over code number for [facility] admission, explained family won't be able to contact her on [facility] without the code number.  Pt is tearful stating her family doesn't care about her, won't bring her any clothes.    "

## 2018-05-19 NOTE — DISCHARGE SUMMARY
Name: Corrine Dumont  Age: 53 y.o.  Sex: female  :  1965  MRN: [id number]         Primary Care Physician: VANDANA Kwon      Date of Admission:  2018  Date of Discharge:  2018      CHIEF COMPLAINT     Drug Overdose and Suicide Attempt       DISCHARGE DIAGNOSIS  Active Hospital Problems (** Indicates Principal Problem)    Diagnosis Date Noted   • **Intentional overdose of beta-adrenergic blocking drug [T44.7X2A] 2018   • Suicidal ideation [R45.851] 2018     Priority: High   • Hypertension [I10] 2018   • Hyperlipidemia [E78.5] 2018   • Depression [F32.9] 2018   • Cardiomyopathy [I42.9] 2018      Resolved Hospital Problems    Diagnosis Date Noted Date Resolved   No resolved problems to display.       SECONDARY DIAGNOSES  Past Medical History:   Diagnosis Date   • Cardiomyopathy    • Depression    • Hyperlipidemia    • Hypertension    • Myocardial infarction        CONSULTS   Consulting Physician(s)     Provider Relationship Specialty    Colt Ramos III, MD Consulting Physician Psychiatry          HOSPITAL COURSE  This is a 53-year-old female who has a history of cardiomyopathy, depression hypertension hyperlipidemia.  Normally she is on Coreg, Xarelto and spironolactone along with Lasix.  She is also been using crack cocaine up to 700 dollars worth month.  On the day of admission she moped 100 dollars worth of crack and also took an full of Coreg, Xarelto, spironolactone.  She was admitted to [facility].  She was given IV fluids for blood pressure support.  She did not have any signs of bradycardia.  Her blood pressure responded to fluids.      Patient is stable medically and has been evaluated by psychiatry and access Center.  She needs continued psychiatric care for mood stabilization as she is severely depressed and still expresses that she has no desire to leave.  She will be transferred to [facility] unit for further care.  Her  medications have been restarted.    PHYSICAL EXAM  Heart Rate:  [65-92] 84  Resp:  [16-18] 16  BP: ()/(53-82) 94/71  Body mass index is 36.49 kg/m².  Physical Exam  HEENT: Unremarkable, pupils are round equal and reacting to light   NECK: No lymphadenopathy, throat is clear,   RESPRATORY SYSTEM: Breath sounds are equal on both sides and are normal, no wheezes or crackles  CARDIOVASULAR SYSTEM: Heart rate is regular without murmur  ABDOMEN: Soft, no ascites, no hepatosplenomegaly.  EXTREMITIES: No cyanosis, clubbing or edema    CONDITION ON DISCHARGE  Stable.      DISCHARGE DISPOSITION   [facility] at [facility]      ALLERGIES  Allergies   Allergen Reactions   • Codeine Hives       RECENT LABS    Results from last 7 days  Lab Units 05/19/18  0546 05/19/18  0001 05/18/18  1505 05/18/18  0332 05/18/18  0012   WBC 10*3/mm3  --   --   --  6.08 6.97   HEMOGLOBIN g/dL 11.5* 11.4* 11.1* 12.4 13.3   HEMATOCRIT % 37.4 36.5 35.2* 39.2 42.1   PLATELETS 10*3/mm3  --   --   --  229 225       Results from last 7 days  Lab Units 05/18/18  0332 05/18/18  0012   SODIUM mmol/L 140 142   POTASSIUM mmol/L 4.1 4.3   CHLORIDE mmol/L 103 103   CO2 mmol/L 23.3 25.2   BUN mg/dL 17 17   CREATININE mg/dL 1.13* 1.24*   GLUCOSE mg/dL 131* 109*   CALCIUM mg/dL 9.3 9.5       Results from last 7 days  Lab Units 05/19/18  0546 05/18/18  0405 05/18/18  0012   INR  1.31* 5.70* 3.68*       DIET;  Diet Order   Procedures   • Diet Regular; Cardiac; Safe Tray       DISCHARGE MEDICATIONS     Your medication list      CHANGE how you take these medications      Instructions Last Dose Given Next Dose Due   furosemide 20 MG tablet  Commonly known as:  LASIX  Start taking on:  5/20/2018  What changed:  when to take this      Take 2 tablets by mouth Daily.          CONTINUE taking these medications      Instructions Last Dose Given Next Dose Due   carvedilol 12.5 MG tablet  Commonly known as:  COREG      Take 25 mg by mouth 2 (Two) Times a Day With Meals.        lisinopril 10 MG tablet  Commonly known as:  PRINIVIL,ZESTRIL      Take 10 mg by mouth Daily.       QUEtiapine 25 MG tablet  Commonly known as:  SEROquel      Take 400 mg by mouth Every Night.       rivaroxaban 10 MG tablet  Commonly known as:  XARELTO      Take 20 mg by mouth Daily.       spironolactone 100 MG tablet  Commonly known as:  ALDACTONE  Start taking on:  5/20/2018      Take 1 tablet by mouth Daily.       Vitamin D (Cholecalciferol) 400 units tablet  Commonly known as:  CHOLECALCIFEROL      Take 400 Units by mouth Daily.             Where to Get Your Medications      Information about where to get these medications is not yet available    Ask your nurse or doctor about these medications  · furosemide 20 MG tablet  · spironolactone 100 MG tablet        No future appointments.  Follow-up Information     VANDANA Kwon .    Specialty:  Internal Medicine  Contact information:  [address] [id number] [phone]                   TEST  RESULTS PENDING AT DISCHARGE  None   Order Current Status    Hemoglobin & Hematocrit, Blood In process           CODE STATUS  Full Code        Errol Rodriguez MD  [facility]  05/19/18      Time: greater than 35 minutes.

## 2018-05-19 NOTE — NURSING NOTE
"Awakened, states she is \"tired.\"   Asked her what her main stressor is, pt states, \"I'm dying, my daughter treats me bad, I don't have anywhere to go.\"    Dr Ramos aware of consult.  [facility] admit when medically clear is most likely next step.  D/w KEVIN Arango    "

## 2018-05-19 NOTE — PLAN OF CARE
Problem: Patient Care Overview  Goal: Plan of Care Review  Outcome: Ongoing (interventions implemented as appropriate)   05/18/18 2012   Coping/Psychosocial   Plan of Care Reviewed With patient   Plan of Care Review   Progress no change   OTHER   Outcome Summary no falls. maintained suicide precautions and safety throughout shift. pt spoke with acces center and wants to go to c[facility] after stable medically. bp improved after ivf's. no signs of bleeding.       Problem: Fall Risk (Adult)  Goal: Absence of Fall  Outcome: Ongoing (interventions implemented as appropriate)      Problem: Suicide Risk (Adult)  Goal: Identify Related Risk Factors and Signs and Symptoms  Outcome: Ongoing (interventions implemented as appropriate)    Goal: Strength-Based Wellness/Recovery  Outcome: Ongoing (interventions implemented as appropriate)    Goal: Physical Safety  Outcome: Ongoing (interventions implemented as appropriate)

## 2018-05-19 NOTE — CONSULTS
IDENTIFYING INFORMATION: The patient is a 53-year-old -American female admitted following a polypharmacy ingestion.    CHIEF COMPLAINT:  Took an overdose    INFORMANT:  Patient and chart    RELIABILITY:  Fair    HISTORY OF PRESENT ILLNESS: The patient is a 53-year-old white female admitted following a polypharmacy ingestion.  She remains hospitalized for medical clearance as she had taken several medications including antihypertensive medication.  Patient reports that she did so because she wanted her life to end.  The patient admits to abuse of crack cocaine.  She also admits to history of bipolar disorder but has not been in treatment for some time secondary to her inability to maintain follow-up.  The patient reports a history of positive response to oxcarbazepine and Seroquel and hopes to restart these medications.  She continues to complain of dysphoric mood and again reports that she is using up to $700 worth crack cocaine a month.  She denies recent changes in sleep or appetite.  She is currently under no psychiatric treatment.    PAST PSYCHIATRIC HISTORY: As above    PAST MEDICAL HISTORY:  Significant for cardiomyopathy, hyperlipidemia, hypertension and myocardial infarction    MEDICATIONS:   Current Facility-Administered Medications   Medication Dose Route Frequency Provider Last Rate Last Dose   • acetaminophen (TYLENOL) tablet 650 mg  650 mg Oral Q4H PRN Mayco Yun MD       • EZ ALEC 50,000 mg  50 g Oral Once Valentin Keane MD       • LORazepam (ATIVAN) tablet 0.5 mg  0.5 mg Oral Q6H PRN Mayco Yun MD   0.5 mg at 05/18/18 2252    Or   • LORazepam (ATIVAN) injection 0.5 mg  0.5 mg Intravenous Q6H PRN Mayco Yun MD   0.5 mg at 05/18/18 7377   • QUEtiapine (SEROquel) tablet 100 mg  100 mg Oral Nightly Elias Snider MD   100 mg at 05/18/18 2252   • sodium chloride 0.9 % flush 1-10 mL  1-10 mL Intravenous PRN Mayco Yun MD       • sodium chloride 0.9 % flush 10 mL  10 mL  Intravenous PRN Valentin Keane MD             ALLERGIES:  Codeine    FAMILY HISTORY:  Noncontributory    SOCIAL HISTORY: The patient's substance use history is described previously.  It is suspected that she is currently homeless.    MENTAL STATUS EXAM: Patient is well-developed well-nourished -American female appearing her stated age.  She is no apparent physical distress of family examination.  She is awake alert and oriented all spheres.  Her mood is dysphoric her affect flat.  Speech is generally relevant coherent.  There are no gross sepsis memory cognition noted.  Intelligence is judged to be average range based on fund of knowledge, the patient's cooperative throughout interview.  She continues to endorse positive suicidal ideation she denies homicidal elation.  She denies any psychotic symptoms.  Her judgment and insight appear to be recently intact.    ASSETS/LIABILITIES: Motivation for change/ongoing substance use, homelessness    DIAGNOSTIC IMPRESSION: Bipolar disorder depressed phase, cocaine use disorder by history, status post polypharmacy ingestion, history of cardiomyopathy and hypertension.    PLAN:  The patient will be transferred to the crisis management unit once she is medically stabilized.  I will look to reinitiate Seroquel oxcarbazepine given the patient's history of positive response these medications and will also address her chemical dependence issues while hospitalized.  The patient is in full agreement with this plan.  In the meantime she will need to continue on suicide precautions with sitter.  Thank you very much for the opportunity to see this patient.

## 2018-05-20 PROBLEM — I95.2 HYPOTENSION DUE TO DRUGS: Status: ACTIVE | Noted: 2018-05-20

## 2018-05-20 PROBLEM — F14.90 CRACK COCAINE USE: Status: ACTIVE | Noted: 2018-05-20

## 2018-05-20 PROBLEM — I50.42 CHRONIC COMBINED SYSTOLIC AND DIASTOLIC CONGESTIVE HEART FAILURE (HCC): Status: ACTIVE | Noted: 2018-05-20

## 2018-05-20 RX ORDER — SPIRONOLACTONE 100 MG/1
100 TABLET, FILM COATED ORAL DAILY
Status: DISCONTINUED | OUTPATIENT
Start: 2018-05-20 | End: 2018-05-24 | Stop reason: HOSPADM

## 2018-05-20 RX ORDER — ALBUTEROL SULFATE 90 UG/1
2 AEROSOL, METERED RESPIRATORY (INHALATION) EVERY 6 HOURS PRN
COMMUNITY

## 2018-05-20 RX ORDER — CARVEDILOL 25 MG/1
25 TABLET ORAL 2 TIMES DAILY WITH MEALS
Status: DISCONTINUED | OUTPATIENT
Start: 2018-05-20 | End: 2018-05-24 | Stop reason: HOSPADM

## 2018-05-20 RX ORDER — QUETIAPINE FUMARATE 200 MG/1
400 TABLET, FILM COATED ORAL NIGHTLY
Status: DISCONTINUED | OUTPATIENT
Start: 2018-05-20 | End: 2018-05-24 | Stop reason: HOSPADM

## 2018-05-20 RX ORDER — OXCARBAZEPINE 300 MG/1
300 TABLET, FILM COATED ORAL 2 TIMES DAILY
Status: DISCONTINUED | OUTPATIENT
Start: 2018-05-20 | End: 2018-05-24 | Stop reason: HOSPADM

## 2018-05-20 RX ORDER — ALBUTEROL SULFATE 2.5 MG/3ML
2.5 SOLUTION RESPIRATORY (INHALATION) EVERY 6 HOURS PRN
Status: DISCONTINUED | OUTPATIENT
Start: 2018-05-20 | End: 2018-05-24 | Stop reason: HOSPADM

## 2018-05-20 RX ADMIN — ACETAMINOPHEN 650 MG: 325 TABLET ORAL at 15:33

## 2018-05-20 RX ADMIN — CARVEDILOL 25 MG: 25 TABLET, FILM COATED ORAL at 08:15

## 2018-05-20 RX ADMIN — SACUBITRIL AND VALSARTAN 1 TABLET: 49; 51 TABLET, FILM COATED ORAL at 15:33

## 2018-05-20 RX ADMIN — SPIRONOLACTONE 100 MG: 100 TABLET ORAL at 08:15

## 2018-05-20 RX ADMIN — QUETIAPINE FUMARATE 400 MG: 200 TABLET, FILM COATED ORAL at 22:14

## 2018-05-20 RX ADMIN — SACUBITRIL AND VALSARTAN 1 TABLET: 49; 51 TABLET, FILM COATED ORAL at 21:12

## 2018-05-20 RX ADMIN — OXCARBAZEPINE 300 MG: 300 TABLET, FILM COATED ORAL at 21:12

## 2018-05-20 RX ADMIN — CARVEDILOL 25 MG: 25 TABLET, FILM COATED ORAL at 17:37

## 2018-05-20 RX ADMIN — FUROSEMIDE 40 MG: 40 TABLET ORAL at 08:15

## 2018-05-20 NOTE — H&P
IDENTIFYING INFORMATION: The patient is a 53-year-old  female admitted in transfer from the main hospital following a polypharmacy ingestion    CHIEF COMPLAINT:  None given    INFORMANT: Pt and chart    RELIABILITY:  Good    HISTORY OF PRESENT ILLNESS: The patient is a 53-year-old -American female admitted in transfer from the main hospital following a polypharmacy ingestion.  She is able to promise safety during today's interview.  She requested reinitiation of Seroquel and oxcarbazepine.  For more complete history of present illness please refer to previous dictated notes.  The patient continues to endorse suicidal thinking but is able to promise safety in the hospital.    PAST PSYCHIATRIC HISTORY: Reviewed no changes    PAST MEDICAL HISTORY:  Reviewed no changes    MEDICATIONS:   Current Facility-Administered Medications   Medication Dose Route Frequency Provider Last Rate Last Dose   • acetaminophen (TYLENOL) tablet 650 mg  650 mg Oral Q4H PRN Colt Ramos III, MD       • albuterol (PROVENTIL) nebulizer solution 0.083% 2.5 mg/3mL  2.5 mg Nebulization Q6H PRN Elias Snider MD       • aluminum-magnesium hydroxide-simethicone (MAALOX MAX) 400-400-40 MG/5ML suspension 15 mL  15 mL Oral Q6H PRN Colt Ramos III, MD       • carvedilol (COREG) tablet 25 mg  25 mg Oral BID With Meals Elias Snider MD       • magnesium hydroxide (MILK OF MAGNESIA) suspension 2400 mg/10mL 10 mL  10 mL Oral Daily PRN Colt Ramos III, MD       • OXcarbazepine (TRILEPTAL) tablet 300 mg  300 mg Oral BID Colt Ramos III, MD       • rivaroxaban (XARELTO) tablet 20 mg  20 mg Oral Daily Elias Snider MD       • sacubitril-valsartan (ENTRESTO) 49-51 MG tablet 1 tablet  1 tablet Oral Q12H Elias Snider MD       • spironolactone (ALDACTONE) tablet 100 mg  100 mg Oral Daily Elias Snider MD             ALLERGIES:  Codeine    FAMILY HISTORY:  Reviewed no changes    SOCIAL HISTORY:  Reviewed no changes    MENTAL STATUS EXAM: Patient is a morbidly obese  female appearing her stated age.  She is no apparent physical distress of family examination.  She is awake alert and oriented in all spheres.  Her mood as well as worker affect constricted.  Speech is generally relevant coherent.  There are no gross sepsis memory cognition noted.  Intelligence is judged to be average range based on fund of knowledge.  The patient's cooperative throughout the interview.  She is currently reporting positive suicidal ideation but is able to promise safety in the hospital.  She denies homicidal elation or psychotic symptoms.  Her judgment and insight appear to be intact.    ASSETS/LIABILITIES: Motivation for change/ongoing symptoms use    DIAGNOSTIC IMPRESSION: Bipolar disorder depressed phase, cocaine use disorder, morbid obesity, hypertension, history of DVT, cardiomyopathy, hyperlipidemia hypertension, MI by history    PLAN:  Patient meets hospital as her safety and stabilization.  As she is able to promise safety in the hospital, we will discontinue her sitter and suicide precautions.  Home medications will be reinitiated including Seroquel and previously prescribed oxcarbazepine.  I will ask social workers see the patient regarding possible residential chemical dependence treatment program following discharge.  ELOS  3-5 days.

## 2018-05-20 NOTE — CONSULTS
Name: Corrine Dumont ADMIT: 2018   : 1965  PCP: VANDANA Kwon    MRN: [id number] LOS: 1 days   AGE/SEX: 53 y.o. female  ROOM: [facility]     Inpatient Hospitalist Consult  Consult performed by: KHOI BEST  Consult ordered by: VIDAL WANG III  Reason for consult: Medical evaluation contributing to current psychiatric illness.      Date of Admit: 2018  Date of Consult: 2018    Subjective     History of Present Illness  Ms. Dumont is a 53 y.o. female admitted to the Crisis Management Unit for further evaluation regarding drug overdose and SI. We were asked to see and evaluate her medical issues as they may pertain to her current psychiatric illness. She was transferred from the main hospital after evaluation due to overdose on multiple cardiac medications in addition to significant crack cocaine ingestion. She has had low blood pressure readings since admission does report some mild lightheadedness upon standing. No presyncope or syncope. She's had some constant chest pain since admission. These were evaluated and she had negative cardiac troponins. She denies shortness of breath but is requesting her albuterol inhaler. No other specific complaints currently other than some generalized musculoskeletal discomforts that are not new.        Past Medical History:   Diagnosis Date   • Cardiomyopathy    • Depression    • Hyperlipidemia    • Hypertension    • Myocardial infarction      History reviewed. No pertinent surgical history.  Family History   Problem Relation Age of Onset   • Heart failure Mother    • Heart failure Father      Social History   Substance Use Topics   • Smoking status: Current Every Day Smoker   • Smokeless tobacco: Not on file   • Alcohol use Yes     Prescriptions Prior to Admission   Medication Sig Dispense Refill Last Dose   • albuterol (PROVENTIL HFA;VENTOLIN HFA) 108 (90 Base) MCG/ACT inhaler Inhale 2 puffs Every 4 (Four) Hours As Needed for Wheezing.       • sacubitril-valsartan (ENTRESTO) 49-51 MG tablet Take 1 tablet by mouth 2 (Two) Times a Day.      • carvedilol (COREG) 12.5 MG tablet Take 25 mg by mouth 2 (Two) Times a Day With Meals.   5/17/2018 at Unknown time   • furosemide (LASIX) 20 MG tablet Take 2 tablets by mouth Daily.      • QUEtiapine (SEROquel) 25 MG tablet Take 400 mg by mouth Every Night.   5/17/2018 at Unknown time   • rivaroxaban (XARELTO) 10 MG tablet Take 20 mg by mouth Daily.   5/17/2018 at Unknown time   • spironolactone (ALDACTONE) 100 MG tablet Take 1 tablet by mouth Daily.      • Vitamin D, Cholecalciferol, (CHOLECALCIFEROL) 400 units tablet Take 400 Units by mouth Daily.   5/17/2018 at Unknown time     Allergies:  Codeine and Lemon oil    Review of Systems   Constitutional: Negative.    HENT: Negative.    Eyes: Negative.    Respiratory: Positive for chest tightness and wheezing.    Cardiovascular: Negative.    Gastrointestinal: Negative.    Endocrine: Negative.    Genitourinary: Negative.    Musculoskeletal: Positive for arthralgias, back pain, myalgias and neck pain.   Skin: Negative.    Neurological: Negative.    Hematological: Negative.    Psychiatric/Behavioral: Negative.         See [facility] note.       Objective      Vital Signs  Temp:  [97 °F (36.1 °C)-97.3 °F (36.3 °C)] 97.3 °F (36.3 °C)  Heart Rate:  [77-78] 77  Resp:  [18] 18  BP: ()/(65-71) 104/65  There is no height or weight on file to calculate BMI.    Physical Exam   Constitutional: She is oriented to person, place, and time. She appears well-developed and well-nourished. No distress.   HENT:   Head: Normocephalic and atraumatic.   Eyes: Conjunctivae and EOM are normal. No scleral icterus.   Neck: Normal range of motion. Neck supple. No JVD present. No tracheal deviation present.   Cardiovascular: Normal rate and regular rhythm.    No murmur heard.  Pulmonary/Chest: Effort normal and breath sounds normal. She has no wheezes. She has no rales.   Abdominal: Soft.  Bowel sounds are normal. She exhibits no distension and no mass. There is no tenderness.   Musculoskeletal: Normal range of motion. She exhibits no edema or deformity.   Neurological: She is alert and oriented to person, place, and time. No cranial nerve deficit.   Skin: Skin is warm and dry. No rash noted. No erythema.   Psychiatric: She has a normal mood and affect. Her behavior is normal.   Nursing note and vitals reviewed.      Results Review:    I reviewed the patient's new clinical results.    Assessment/Plan     Active Hospital Problems (** Indicates Principal Problem)    Diagnosis Date Noted   • **Depression with suicidal ideation [F32.9, R45.851] 05/19/2018   • Hypotension due to drug overdose [I95.2] 05/20/2018   • Crack cocaine use [F14.90] 05/20/2018   • Chronic combined systolic and diastolic congestive heart failure [I50.42] 05/20/2018   • Cardiomyopathy [I42.9] 05/18/2018   • Hypertension [I10] 05/18/2018   • Intentional overdose of beta-adrenergic blocking drug [T44.7X2A] 05/18/2018      Resolved Hospital Problems    Diagnosis Date Noted Date Resolved   No resolved problems to display.       Assessment & Plan    Assessment:   She needs to resume her preadmission cardiac medications due to her significant cardiac history. Currently her blood pressure is too low and I will resume only Coreg for now. Holding parameters have been written. She seems euvolemic and will hold diuretics for now. Anticipate her blood pressure should come back up as the medications she overdosed on clear from her system. We will follow along.        Elias Snider MD  [facility]  05/20/18  3:16 PM

## 2018-05-20 NOTE — PLAN OF CARE
Problem: Patient Care Overview  Goal: Plan of Care Review  Outcome: Ongoing (interventions implemented as appropriate)   05/20/18 0303 05/20/18 1100 05/20/18 1510   Coping/Psychosocial   Plan of Care Reviewed With --  patient --    Coping/Psychosocial   Patient Agreement with Plan of Care --  agrees --    Plan of Care Review   Progress improving --  --    OTHER   Outcome Summary --  --  Pt has been cooperative with care and compliant with medications this shift. Pt has been out of room, participated in groups and socialized some in dayroom with peers. Pt contracts for safety with this nurse and MD so 1:1 sitter and suicide precautions were discontinued. Pt rates anxiety 7/10 and depression 10/10 and denies HI and hallucinations. Will continue to monitor pt for safety and any change in condition.       Problem: Overarching Goals (Adult)  Goal: Adheres to Safety Considerations for Self and Others  Outcome: Ongoing (interventions implemented as appropriate)   05/20/18 1510   Overarching Goals (Adult)   Adheres to Safety Considerations for Self and Others making progress toward outcome     Intervention: Develop and Maintain Individualized Safety Plan   05/20/18 1100 05/20/18 1400   C-SSRS (Recent)   Wish to be Dead yes --    Suicidal Thoughts yes --    Suicidal Thought with Method No Plan/Intent no --    Suicidal Intent (without Specific Plan) no --    Suicide Intent with Specific Plan no --    Describe Plan (Suicide Intent) no --    Suicide Behavior yes --    Describe Actions (Suicidal Behavior) within the last three months --    Violence Risk   Feels Like Hurting Others no --    Previous Attempt to Harm Others no --    Develop and Maintain Individualized Safety Plan   Safety Measures --  safety rounds completed       Goal: Optimized Coping Skills in Response to Life Stressors  Outcome: Ongoing (interventions implemented as appropriate)   05/20/18 1510   Overarching Goals (Adult)   Optimized Coping Skills in Response  to Life Stressors making progress toward outcome     Intervention: Promote Effective Coping Strategies   05/20/18 1100   Coping/Psychosocial Interventions   Supportive Measures active listening utilized;positive reinforcement provided;problem solving facilitated       Goal: Develops/Participates in Therapeutic [place] to Support Successful Transition  Outcome: Ongoing (interventions implemented as appropriate)   05/20/18 1510   Overarching Goals (Adult)   Develops/Participates in Therapeutic [place] to Support Successful Transition making progress toward outcome     Intervention: Foster Therapeutic [place]   05/20/18 1100   Interventions   Trust Relationship/Rapport care explained;choices provided;thoughts/feelings acknowledged;questions answered;emotional support provided     Intervention: Mutually Develop Transition Plan   05/20/18 1510   Mutually Develop Transition Plan   Transition Support crisis management plan promoted         Problem: Suicidal Behavior (Adult)  Intervention: Facilitate Resolution of Suicidal Intent   05/20/18 1510   Facilitate Resolution of Suicidal Intent   Mutually Determined Action Steps (Facilitate Resolution of Suicidal Intent) sets future-oriented goal     Intervention: Provide Immediate/Ongoing Protective Physical Environment   05/20/18 1510   Provide Immediate/Ongoing Protective Physical Environment   Mutually Determined Action Steps (Provide Immediate/Ongoing Protective Physical Environment) shares suicidal thoughts;verbalizes safety check rationale       Goal: Suicidal Behavior is Absent/Minimized/Managed  Outcome: Ongoing (interventions implemented as appropriate)   05/20/18 1510   Suicidal Behavior is Absent/Minimized/Managed   Suicidal Behavior Managed/Minimized Time Frame for Action Step (STG) 3 days   Suicidal Behavior Managed/Minimized Action Step (STG) Outcome making progress toward outcome   OTHER   Action Step/Short Term Goal (STG) Established 05/19/18

## 2018-05-20 NOTE — PLAN OF CARE
"Problem: Patient Care Overview  Goal: Plan of Care Review  Outcome: Ongoing (interventions implemented as appropriate)   05/20/18 0303   Coping/Psychosocial   Plan of Care Reviewed With patient   Coping/Psychosocial   Patient Agreement with Plan of Care agrees   Plan of Care Review   Progress improving   OTHER   Outcome Summary Pt stays in room in bed, states she is trying to catch up on sleep, pt asked how long she will be here and after educated on lengths of stay being individualized based on progress pt states she will be here at least 2 weeks in order for her to feel better, pt states \"I don't want to live any more. I just want to sleep\" rates anxiety 10/10, depression 10/10, pt on 1:1, denies HI, will continue to monitor for safety       Problem: Overarching Goals (Adult)  Goal: Adheres to Safety Considerations for Self and Others  Outcome: Ongoing (interventions implemented as appropriate)   05/20/18 0303   Overarching Goals (Adult)   Adheres to Safety Considerations for Self and Others making progress toward outcome     Goal: Optimized Coping Skills in Response to Life Stressors  Outcome: Ongoing (interventions implemented as appropriate)   05/20/18 0303   Overarching Goals (Adult)   Optimized Coping Skills in Response to Life Stressors making progress toward outcome     Goal: Develops/Participates in Therapeutic [place] to Support Successful Transition  Outcome: Ongoing (interventions implemented as appropriate)   05/20/18 0303   Overarching Goals (Adult)   Develops/Participates in Therapeutic [place] to Support Successful Transition making progress toward outcome       Problem: Suicidal Behavior (Adult)  Goal: Suicidal Behavior is Absent/Minimized/Managed  Outcome: Ongoing (interventions implemented as appropriate)   05/20/18 0303   Suicidal Behavior is Absent/Minimized/Managed   Suicidal Behavior Managed/Minimized Time Frame for Action Step (STG) 4 days   Suicidal Behavior Managed/Minimized Action Step " (STG) Outcome making progress toward outcome

## 2018-05-21 RX ADMIN — RIVAROXABAN 20 MG: 20 TABLET, FILM COATED ORAL at 08:44

## 2018-05-21 RX ADMIN — OXCARBAZEPINE 300 MG: 300 TABLET, FILM COATED ORAL at 08:44

## 2018-05-21 RX ADMIN — CARVEDILOL 25 MG: 25 TABLET, FILM COATED ORAL at 08:44

## 2018-05-21 RX ADMIN — ACETAMINOPHEN 650 MG: 325 TABLET ORAL at 08:48

## 2018-05-21 RX ADMIN — OXCARBAZEPINE 300 MG: 300 TABLET, FILM COATED ORAL at 21:07

## 2018-05-21 RX ADMIN — SPIRONOLACTONE 100 MG: 100 TABLET ORAL at 08:44

## 2018-05-21 RX ADMIN — SACUBITRIL AND VALSARTAN 1 TABLET: 49; 51 TABLET, FILM COATED ORAL at 08:44

## 2018-05-21 RX ADMIN — QUETIAPINE FUMARATE 400 MG: 200 TABLET, FILM COATED ORAL at 21:51

## 2018-05-21 RX ADMIN — SACUBITRIL AND VALSARTAN 1 TABLET: 49; 51 TABLET, FILM COATED ORAL at 21:07

## 2018-05-21 RX ADMIN — CARVEDILOL 25 MG: 25 TABLET, FILM COATED ORAL at 17:27

## 2018-05-21 RX ADMIN — ACETAMINOPHEN 650 MG: 325 TABLET ORAL at 21:07

## 2018-05-21 NOTE — PLAN OF CARE
Problem: Patient Care Overview  Goal: Plan of Care Review  Outcome: Ongoing (interventions implemented as appropriate)   05/21/18 0915 05/21/18 1708   Coping/Psychosocial   Plan of Care Reviewed With patient --    Coping/Psychosocial   Patient Agreement with Plan of Care agrees --    Plan of Care Review   Progress --  improving   OTHER   Outcome Summary --  Pt spent most of the shift sleeping in her room. Pt stated that she was very tired and needed to sleep. Pt rated both anxiety and depression 8/10 and denied SI/HI. Pt denied any chest pain but did complain about chronic twitching in her legs. Will continue to monitor. 5/21/18 1711       Problem: Overarching Goals (Adult)  Goal: Adheres to Safety Considerations for Self and Others  Outcome: Ongoing (interventions implemented as appropriate)   05/21/18 1708   Overarching Goals (Adult)   Adheres to Safety Considerations for Self and Others making progress toward outcome     Goal: Optimized Coping Skills in Response to Life Stressors  Outcome: Ongoing (interventions implemented as appropriate)   05/21/18 1708   Overarching Goals (Adult)   Optimized Coping Skills in Response to Life Stressors making progress toward outcome     Goal: Develops/Participates in Therapeutic [place] to Support Successful Transition  Outcome: Ongoing (interventions implemented as appropriate)   05/21/18 1708   Overarching Goals (Adult)   Develops/Participates in Therapeutic [place] to Support Successful Transition making progress toward outcome       Problem: Suicidal Behavior (Adult)  Goal: Suicidal Behavior is Absent/Minimized/Managed  Outcome: Ongoing (interventions implemented as appropriate)   05/21/18 1708   Suicidal Behavior is Absent/Minimized/Managed   Suicidal Behavior Managed/Minimized Time Frame for Action Step (STG) 4 days   Suicidal Behavior Managed/Minimized Action Step (STG) Outcome making progress toward outcome

## 2018-05-21 NOTE — PLAN OF CARE
Problem: Patient Care Overview  Goal: Individualization and Mutuality  Outcome: Ongoing (interventions implemented as appropriate)   05/21/18 1014   Personal Strengths/Vulnerabilities   Patient Personal Strengths family/social support;independent living skills     Goal: Interprofessional Rounds/Family Conf  Outcome: Ongoing (interventions implemented as appropriate)   05/21/18 1014   Interdisciplinary Rounds/Family Conf   Summary Treatment team met to discuss plan of care. Plan for NILA consult and  consult.  to assess for discharge needs.   Interdisciplinary Rounds/Family Conf   Participants ;pharmacy;nursing;psychiatrist;social work     Patient/Guardian Signature: __________________________________            Psychiatrist Signature: ______________________________________             Therapist Signature: ________________________________________         Nurse Signature: ___________________________________________          Staff Signature: ____________________________________________            Staff Signature: ____________________________________________          Staff Signature: ____________________________________________          Staff Signature:                                                                                                      Problem: Suicidal Behavior (Adult)  Goal: Suicidal Behavior is Absent/Minimized/Managed  Outcome: Ongoing (interventions implemented as appropriate)   05/20/18 1510 05/21/18 0406   Suicidal Behavior is Absent/Minimized/Managed   Suicidal Behavior Managed/Minimized Time Frame for Action Step (STG) --  2 days   Suicidal Behavior Managed/Minimized Action Step (STG) Outcome --  making progress toward outcome   OTHER   Action Step/Short Term Goal (STG) Established 05/19/18 --

## 2018-05-21 NOTE — PLAN OF CARE
Problem: Patient Care Overview  Goal: Plan of Care Review  Outcome: Ongoing (interventions implemented as appropriate)   05/21/18 0406   Coping/Psychosocial   Plan of Care Reviewed With patient   Coping/Psychosocial   Patient Agreement with Plan of Care agrees   Plan of Care Review   Progress improving   OTHER   Outcome Summary pt spends time out in milieu with peers, socializes and watches tv, pt reports she feels good today, rates anxiety 7/10, depression 9/10, denies HI and denies SI while in hospital, will continue to monitor for safety       Problem: Overarching Goals (Adult)  Goal: Adheres to Safety Considerations for Self and Others  Outcome: Ongoing (interventions implemented as appropriate)   05/21/18 0406   Overarching Goals (Adult)   Adheres to Safety Considerations for Self and Others making progress toward outcome     Goal: Optimized Coping Skills in Response to Life Stressors  Outcome: Ongoing (interventions implemented as appropriate)   05/21/18 0406   Overarching Goals (Adult)   Optimized Coping Skills in Response to Life Stressors making progress toward outcome     Goal: Develops/Participates in Therapeutic [place] to Support Successful Transition  Outcome: Ongoing (interventions implemented as appropriate)   05/21/18 0406   Overarching Goals (Adult)   Develops/Participates in Therapeutic [place] to Support Successful Transition making progress toward outcome       Problem: Suicidal Behavior (Adult)  Goal: Suicidal Behavior is Absent/Minimized/Managed  Outcome: Ongoing (interventions implemented as appropriate)   05/21/18 0406   Suicidal Behavior is Absent/Minimized/Managed   Suicidal Behavior Managed/Minimized Time Frame for Action Step (STG) 2 days   Suicidal Behavior Managed/Minimized Action Step (STG) Outcome making progress toward outcome

## 2018-05-21 NOTE — PROGRESS NOTES
Name: Corrine Dumont ADMIT: 2018   : 1965  PCP: VANDANA Kwon    MRN: [id number] LOS: 2 days   AGE/SEX: 53 y.o. female  ROOM: [facility]   Subjective   Complains of body aches. No fevers, chills, nausea or vomiting.     Objective   Vital Signs  Temp:  [96.8 °F (36 °C)-97.4 °F (36.3 °C)] 97.4 °F (36.3 °C)  Heart Rate:  [72-80] 80  Resp:  [16-19] 16  BP: ()/(72-85) 108/85  SpO2:  [96 %-99 %] 99 %  on  Flow (L/min):  [2] 2;   Device (Oxygen Therapy): room air  There is no height or weight on file to calculate BMI.    Physical Exam   Constitutional: She is oriented to person, place, and time. No distress.   Cardiovascular: Normal rate and regular rhythm.    No murmur heard.  Pulmonary/Chest: Effort normal and breath sounds normal.   Abdominal: Soft. Bowel sounds are normal. She exhibits no distension. There is no tenderness.   Musculoskeletal: Normal range of motion. She exhibits no edema.   Neurological: She is alert and oriented to person, place, and time.   Skin: Skin is warm and dry. She is not diaphoretic.       Results Review:       I reviewed the patient's new clinical results.    Results from last 7 days  Lab Units 18  1553 18  0546 18  0001 18  1505 18  0332 18  0012   WBC 10*3/mm3  --   --   --   --  6.08 6.97   HEMOGLOBIN g/dL 11.2* 11.5* 11.4* 11.1* 12.4 13.3   PLATELETS 10*3/mm3  --   --   --   --  229 225     Results from last 7 days  Lab Units 18  0332 18  0012   SODIUM mmol/L 140 142   POTASSIUM mmol/L 4.1 4.3   CHLORIDE mmol/L 103 103   CO2 mmol/L 23.3 25.2   BUN mg/dL 17 17   CREATININE mg/dL 1.13* 1.24*   GLUCOSE mg/dL 131* 109*   Estimated Creatinine Clearance: 74.4 mL/min (A) (by C-G formula based on SCr of 1.13 mg/dL (H)).  Results from last 7 days  Lab Units 18  0332 18  0012   CALCIUM mg/dL 9.3 9.5   ALBUMIN g/dL  --  4.30         carvedilol 25 mg Oral BID With Meals   OXcarbazepine 300 mg Oral BID   QUEtiapine  400 mg Oral Nightly   rivaroxaban 20 mg Oral Daily   sacubitril-valsartan 1 tablet Oral Q12H   spironolactone 100 mg Oral Daily      Diet Regular; Cardiac, Consistent Carbohydrate      Assessment/Plan      Active Hospital Problems (** Indicates Principal Problem)    Diagnosis Date Noted   • **Depression with suicidal ideation [F32.9, R45.851] 05/19/2018   • Hypotension due to drug overdose [I95.2] 05/20/2018   • Crack cocaine use [F14.90] 05/20/2018   • Chronic combined systolic and diastolic congestive heart failure [I50.42] 05/20/2018   • Cardiomyopathy [I42.9] 05/18/2018   • Hypertension [I10] 05/18/2018   • Intentional overdose of beta-adrenergic blocking drug [T44.7X2A] 05/18/2018      Resolved Hospital Problems    Diagnosis Date Noted Date Resolved   No resolved problems to display.       Continue to monitor her blood pressure and restart her cardiac medications as blood pressure allows.  Most recent blood pressure 108/85.  Receiving Coreg and Entresto.      Elias Snider MD  [facility]  05/21/18  4:34 PM

## 2018-05-21 NOTE — CONSULTS
"Reviewed chart and interviewed pt. Pt states she smokes crack \"about once a month\" She smokes 300.00 to 400.00 at a time. \"I do not mess with pipes or none of that\" Pt states that on 18 her doctor told her she had 1 year to live because \"my hear Heart is ad\" Pt states that she was diagnosed with congestive heart failure at age 50. \"My mom , dad and both grandparents  from it\" \"I smoked crack and its happening sooner with me\"  \"I want to quit but I do not have the time to go to treatment\"  Discussed that she has 5 grand children to say good bye to and spend time with and crack will steal the time she has left. \"Crack takes me away for a minute and my daughter is the trigger for me\" \"She takes percocet's and tabs\" \"I love her but I hate her ways\" Discussed online meetings for AA and refuge recovery access by putting in by name on address line of computer.  "

## 2018-05-21 NOTE — CONSULTS
Visited with pt and discussed spiritual issues and resources that could be helpful in her situation. Discussed grief issues openly. Expressed frustration and anxiety openly. Advised pt to consult  again if further discussion would be helpful.

## 2018-05-21 NOTE — PROGRESS NOTES
"The patient complains of \"not feeling good\" today, stating that she is feels as though she is suffering from flulike symptoms.  She is now on oxygen by nasal cannula.  She continues to complain of dysphoric mood but remains hopeful and invested in treatment.  She has been active within the therapeutic milieu.  She is able to promise safety in the hospital though  continuing to complain of suicidal thinking.  "

## 2018-05-21 NOTE — PROGRESS NOTES
Continued Stay Note   [facility]     Patient Name: Corrine Dumont  MRN: [id number]  Today's Date: 5/21/2018    Admit Date: 5/19/2018          Discharge Plan     Row Name 05/21/18 1423       Plan    Plan Pt stated that she will live w/a friend when d/c'd but desires to have own place.  Pt will receive a 1:1 session, CD and  consult.  SW will explore outpt providers for continuity of care.    Plan Comments SW met w/pt to discuss treatment and discharge planning.  Pt was able to verify demographic info but stated that she is not returning home.  SW inquired about living arrangements; pt stated that she is not going back home but will live with a friend.  Pt stated that she wants to get her own place but stated that every place that she checked into has a 6 to 12 month waiting list.  SW inquired about income; pt stated that she receives$750 a month SSI.  SW also mentioned pt going to residential treatment for substance abuse; pt stated that she only has a year to live and don't want to spend 2 to 3 months in treatment.  SW asked about her having a year to live; pt did not elaborate on the comment.  Pt stated that she didn't want to talk any longer; SW thanked her for  her time and advised that they could talk later.              Discharge Codes    No documentation.           CINDY Qiu

## 2018-05-21 NOTE — PLAN OF CARE
Problem: Patient Care Overview  Goal: Discharge Needs Assessment  Outcome: Ongoing (interventions implemented as appropriate)   05/19/18 1903 05/21/18 1422 05/21/18 1428   Discharge Needs Assessment   Concerns to be Addressed --  mental health;coping/stress;decision making;substance/tobacco abuse/use;suicidal;discharge planning --    Patient/Family Anticipates Transition to family members' home --  --    Patient/Family Anticipated Services at Transition outpatient care --  --    Transportation Anticipated (to be determined ) --  --    Discharge Coordination/Progress --  --  Pt stated that she will live w/a friend when d/c'd but desires to have own place. Pt will receive a 1:1 session, CD and  consult. SW will explore outpt providers for continuity of care.

## 2018-05-22 LAB
CHOLEST SERPL-MCNC: 138 MG/DL (ref 0–200)
HDLC SERPL-MCNC: 53 MG/DL (ref 40–60)
LDLC SERPL CALC-MCNC: 59 MG/DL (ref 0–100)
LDLC/HDLC SERPL: 1.12 {RATIO}
TRIGL SERPL-MCNC: 128 MG/DL (ref 0–150)
VLDLC SERPL-MCNC: 25.6 MG/DL (ref 5–40)

## 2018-05-22 PROCEDURE — 80061 LIPID PANEL: CPT | Performed by: SPECIALIST

## 2018-05-22 RX ADMIN — CARVEDILOL 25 MG: 25 TABLET, FILM COATED ORAL at 08:10

## 2018-05-22 RX ADMIN — QUETIAPINE FUMARATE 400 MG: 200 TABLET, FILM COATED ORAL at 22:41

## 2018-05-22 RX ADMIN — ACETAMINOPHEN 650 MG: 325 TABLET ORAL at 12:48

## 2018-05-22 RX ADMIN — OXCARBAZEPINE 300 MG: 300 TABLET, FILM COATED ORAL at 08:10

## 2018-05-22 RX ADMIN — OXCARBAZEPINE 300 MG: 300 TABLET, FILM COATED ORAL at 22:41

## 2018-05-22 RX ADMIN — RIVAROXABAN 20 MG: 20 TABLET, FILM COATED ORAL at 08:10

## 2018-05-22 NOTE — PLAN OF CARE
"Problem: Patient Care Overview  Goal: Plan of Care Review  Outcome: Ongoing (interventions implemented as appropriate)   05/22/18 0810 05/22/18 1627   Coping/Psychosocial   Plan of Care Reviewed With patient --    Coping/Psychosocial   Patient Agreement with Plan of Care agrees --    Plan of Care Review   Progress --  improving   OTHER   Outcome Summary --  Pt cooperative with care this shift. Upon assessment pt complained of feeling achy and \"fluey\". Pt denied SI/HI. Pt rated anxiety and depression both \"11\". Pt stated that this was d/t the fact that with her heart condition she would \"die within the year\". Pt's Entresto and Aldactone held in AM d/t pt's sys BP <110 per medication parameters. Pt given tylenol at 1248 d/t \"whole body hurting\". Pt attended group therapy in afternoon and compliant with medications. Will continue to monitor and provide support.       Problem: Overarching Goals (Adult)  Goal: Adheres to Safety Considerations for Self and Others  Outcome: Ongoing (interventions implemented as appropriate)   05/22/18 1627   Overarching Goals (Adult)   Adheres to Safety Considerations for Self and Others making progress toward outcome     Intervention: Develop and Maintain Individualized Safety Plan   05/22/18 0810 05/22/18 1600   C-SSRS (Recent)   Wish to be Dead no --    Suicidal Thoughts no --    Suicidal Thought with Method No Plan/Intent no --    Suicidal Intent (without Specific Plan) no --    Suicide Intent with Specific Plan no --    Describe Plan (Suicide Intent) no --    Suicide Behavior no --    Describe Actions (Suicidal Behavior) within the last three months --    Violence Risk   Feels Like Hurting Others no --    Previous Attempt to Harm Others no --    Develop and Maintain Individualized Safety Plan   Safety Measures --  safety rounds completed       Goal: Optimized Coping Skills in Response to Life Stressors  Outcome: Ongoing (interventions implemented as appropriate)   05/22/18 1627 "   Overarching Goals (Adult)   Optimized Coping Skills in Response to Life Stressors making progress toward outcome     Intervention: Promote Effective Coping Strategies   05/22/18 0810   Coping/Psychosocial Interventions   Supportive Measures active listening utilized;positive reinforcement provided;verbalization of feelings encouraged;self-care encouraged       Goal: Develops/Participates in Therapeutic [place] to Support Successful Transition  Outcome: Ongoing (interventions implemented as appropriate)   05/22/18 1627   Overarching Goals (Adult)   Develops/Participates in Therapeutic [place] to Support Successful Transition making progress toward outcome     Intervention: Foster Therapeutic [place]   05/22/18 0810   Interventions   Trust Relationship/Rapport care explained;choices provided;emotional support provided;empathic listening provided;questions answered;questions encouraged;reassurance provided;thoughts/feelings acknowledged     Intervention: Mutually Develop Transition Plan   05/22/18 0810   Mutually Develop Transition Plan   Transition Support crisis management plan promoted         Problem: Suicidal Behavior (Adult)  Intervention: Facilitate Resolution of Suicidal Intent   05/22/18 1627   Facilitate Resolution of Suicidal Intent   Mutually Determined Action Steps (Facilitate Resolution of Suicidal Intent) identifies protective factors;sets future-oriented goal     Intervention: Provide Immediate/Ongoing Protective Physical Environment   05/22/18 1627   Provide Immediate/Ongoing Protective Physical Environment   Mutually Determined Action Steps (Provide Immediate/Ongoing Protective Physical Environment) shares suicidal thoughts;identifies home safety strategy       Goal: Suicidal Behavior is Absent/Minimized/Managed  Outcome: Ongoing (interventions implemented as appropriate)   05/22/18 1627   Suicidal Behavior is Absent/Minimized/Managed   Suicidal Behavior Managed/Minimized Time Frame for Action  Step (STG) 3 days   Suicidal Behavior Managed/Minimized Action Step (STG) Outcome making progress toward outcome   OTHER   Action Step/Short Term Goal (STG) Established 05/22/18

## 2018-05-22 NOTE — PLAN OF CARE
Problem: Patient Care Overview  Goal: Plan of Care Review  Outcome: Ongoing (interventions implemented as appropriate)   05/22/18 0312   Coping/Psychosocial   Plan of Care Reviewed With patient   Coping/Psychosocial   Patient Agreement with Plan of Care agrees   Plan of Care Review   Progress improving   OTHER   Outcome Summary Pt in bed resting except at snack time, pt social and smiling during snack and then back to bed, rates anxiety 6/10, depression 8/10, denies SI/HI, will continue to monitor for safety       Problem: Overarching Goals (Adult)  Goal: Adheres to Safety Considerations for Self and Others  Outcome: Ongoing (interventions implemented as appropriate)   05/22/18 0312   Overarching Goals (Adult)   Adheres to Safety Considerations for Self and Others making progress toward outcome     Goal: Optimized Coping Skills in Response to Life Stressors  Outcome: Ongoing (interventions implemented as appropriate)   05/22/18 0312   Overarching Goals (Adult)   Optimized Coping Skills in Response to Life Stressors making progress toward outcome     Goal: Develops/Participates in Therapeutic [place] to Support Successful Transition  Outcome: Ongoing (interventions implemented as appropriate)   05/22/18 0312   Overarching Goals (Adult)   Develops/Participates in Therapeutic [place] to Support Successful Transition making progress toward outcome       Problem: Suicidal Behavior (Adult)  Goal: Suicidal Behavior is Absent/Minimized/Managed  Outcome: Ongoing (interventions implemented as appropriate)   05/22/18 0312   Suicidal Behavior is Absent/Minimized/Managed   Suicidal Behavior Managed/Minimized Time Frame for Action Step (STG) 3 days   Suicidal Behavior Managed/Minimized Action Step (STG) Outcome making progress toward outcome

## 2018-05-22 NOTE — PROGRESS NOTES
"Patient continues to complain of feeling \"weak and achy all over\" and has not been active within the therapeutic milieu though staff was noted her jovially interacting with peers on the unit.  I have gently confronted the patient regarding her need to increase this patient within the therapeutic milieu.  She is tolerated reinitiation of oxcarbazepine and quetiapine without complaint.  She is not endorsing suicidal ideation at this time.  "

## 2018-05-23 RX ADMIN — ACETAMINOPHEN 650 MG: 325 TABLET ORAL at 22:04

## 2018-05-23 RX ADMIN — QUETIAPINE FUMARATE 400 MG: 200 TABLET, FILM COATED ORAL at 21:00

## 2018-05-23 RX ADMIN — CARVEDILOL 25 MG: 25 TABLET, FILM COATED ORAL at 20:25

## 2018-05-23 RX ADMIN — OXCARBAZEPINE 300 MG: 300 TABLET, FILM COATED ORAL at 22:00

## 2018-05-23 RX ADMIN — SPIRONOLACTONE 100 MG: 100 TABLET ORAL at 09:05

## 2018-05-23 RX ADMIN — RIVAROXABAN 20 MG: 20 TABLET, FILM COATED ORAL at 09:05

## 2018-05-23 RX ADMIN — SACUBITRIL AND VALSARTAN 1 TABLET: 49; 51 TABLET, FILM COATED ORAL at 09:04

## 2018-05-23 RX ADMIN — OXCARBAZEPINE 300 MG: 300 TABLET, FILM COATED ORAL at 09:04

## 2018-05-23 RX ADMIN — ACETAMINOPHEN 650 MG: 325 TABLET ORAL at 09:05

## 2018-05-23 RX ADMIN — CARVEDILOL 25 MG: 25 TABLET, FILM COATED ORAL at 09:04

## 2018-05-23 NOTE — PROGRESS NOTES
Continued Stay Note   [facility]     Patient Name: Corrine Dumont  MRN: [id number]  Today's Date: 5/23/2018    Admit Date: 5/19/2018          Discharge Plan     Row Name 05/23/18 1232       Plan    Plan Comments SW met w/pt to discuss discharge planning.  Pt stated that she will be going to live w/a friend once she is discharged.  Pt expressed concern about not having her oxygen concentrator due to it being at the house; SW asked if pt could have someone transport the concentrator to where she will be living.  Pt stated that probably not.  Pt began to discuss housing options stating that she don't want to live in certain areas and that she owed an lg&e bill. SW discussed pt getting some type of treatment for substance abuse; pt stated that she was open to treatment but doesn't know how she would get to the appt.              Discharge Codes    No documentation.           CINDY Qiu

## 2018-05-23 NOTE — PLAN OF CARE
Problem: Patient Care Overview  Goal: Plan of Care Review  Outcome: Ongoing (interventions implemented as appropriate)   05/23/18 1640   Coping/Psychosocial   Plan of Care Reviewed With patient   Plan of Care Review   Progress improving   OTHER   Outcome Summary PT rates anxiety 8/10, depression 10/10. Denies SI/HI nor hallucinations. C/o generalized discomfort, admin PRN Tylenol 650 mg for comfort. Cooperative with treatments and compliant with medications. Will continue to monitor behavior and provide support.      Goal: Individualization and Mutuality  Outcome: Ongoing (interventions implemented as appropriate)   05/23/18 1640   Personal Strengths/Vulnerabilities   Patient Personal Strengths motivated for recovery;motivated for treatment;medication/treatment adherence;expressive of emotions;expressive of needs     Goal: Discharge Needs Assessment  Outcome: Ongoing (interventions implemented as appropriate)    Goal: Interprofessional Rounds/Family Conf  Outcome: Ongoing (interventions implemented as appropriate)

## 2018-05-23 NOTE — PROGRESS NOTES
"The patient is noted to be a bed but does leave her room for up this patient in the group after having been gently confronted regarding this.  She continues to complain of depression.  The patient is reporting to staff and appears that she is \"terminal\" related to an unknown heart disorder.  There is no evidence of this.  The patient does not wish to go to a drug rehabilitation but instead live with a relative following discharge which should take place tomorrow.  "

## 2018-05-24 VITALS
TEMPERATURE: 97.6 F | DIASTOLIC BLOOD PRESSURE: 80 MMHG | HEART RATE: 85 BPM | RESPIRATION RATE: 16 BRPM | SYSTOLIC BLOOD PRESSURE: 124 MMHG | OXYGEN SATURATION: 100 %

## 2018-05-24 RX ORDER — QUETIAPINE FUMARATE 400 MG/1
400 TABLET, FILM COATED ORAL NIGHTLY
Qty: 30 TABLET | Refills: 1 | Status: SHIPPED | OUTPATIENT
Start: 2018-05-24

## 2018-05-24 RX ORDER — OXCARBAZEPINE 300 MG/1
300 TABLET, FILM COATED ORAL 2 TIMES DAILY
Qty: 60 TABLET | Refills: 1 | Status: SHIPPED | OUTPATIENT
Start: 2018-05-24

## 2018-05-24 RX ADMIN — OXCARBAZEPINE 300 MG: 300 TABLET, FILM COATED ORAL at 09:31

## 2018-05-24 RX ADMIN — SACUBITRIL AND VALSARTAN 1 TABLET: 49; 51 TABLET, FILM COATED ORAL at 09:31

## 2018-05-24 RX ADMIN — CARVEDILOL 25 MG: 25 TABLET, FILM COATED ORAL at 09:31

## 2018-05-24 RX ADMIN — SPIRONOLACTONE 100 MG: 100 TABLET ORAL at 09:31

## 2018-05-24 RX ADMIN — RIVAROXABAN 20 MG: 20 TABLET, FILM COATED ORAL at 09:31

## 2018-05-24 NOTE — DISCHARGE SUMMARY
DATES OF ADMISSION: 5/19/2018-5/24/18    REASON FOR ADMISSION: The patient is a 53-year-old  male admitted in transfer from the main hospital with depressed mood and chemical dependence.    LABS:  UDS positive for cocaine and cannabis    HOSPITAL COURSE:  Patient was admitted to the crisis management unit and placed on suicide precautions.  These discontinued if the patient was able to promise safety in the hospital.  Patient reported history of positive response to oxcarbazepine and Seroquel.  Accordingly, these medications were restarted at the patient's previous doses.  The patient tolerated the medications well.  Her purchase patient within the therapeutic milieu left much to be desired but the patient did show improvement in mood and tolerated reinitiation of medications without complaint.  She declined an offer of residential chemical dependence treatment.  By 5/24/2018 the patient was denying suicidal request discharge.  It was ordered.    FINAL DIAGNOSIS:  Bipolar disorder most recently depressed, cocaine use disorder, cannabis use disorder, obesity, hypertension, coronary artery disease    DISPOSITION ON DISCHARGE:  A full listing of the patient's medications is provided below.  The patient will follow through the auspices of community mental health and chemical dependence treatment resources.    PROGNOSIS: Amie     Dumont Corrine   Home Medication Instructions CHANCE:[id number]    Printed on:05/24/18 1037   Medication Information                      albuterol (PROVENTIL HFA;VENTOLIN HFA) 108 (90 Base) MCG/ACT inhaler  Inhale 2 puffs Every 6 (Six) Hours As Needed for Wheezing.             carvedilol (COREG) 25 MG tablet  Take 25 mg by mouth 2 (Two) Times a Day With Meals.             furosemide (LASIX) 20 MG tablet  Take 2 tablets by mouth Daily.             OXcarbazepine (TRILEPTAL) 300 MG tablet  Take 1 tablet by mouth 2 (Two) Times a Day.             QUEtiapine (SEROquel) 400 MG tablet  Take 1  tablet by mouth Every Night.             rivaroxaban (XARELTO) 10 MG tablet  Take 20 mg by mouth Daily.             sacubitril-valsartan (ENTRESTO) 49-51 MG tablet  Take 1 tablet by mouth 2 (Two) Times a Day.             spironolactone (ALDACTONE) 100 MG tablet  Take 1 tablet by mouth Daily.             Vitamin D, Cholecalciferol, (CHOLECALCIFEROL) 400 units tablet  Take 400 Units by mouth Daily.

## 2018-05-24 NOTE — PLAN OF CARE
Problem: Patient Care Overview  Goal: Plan of Care Review  Outcome: Ongoing (interventions implemented as appropriate)   05/24/18 0115 05/24/18 0441   Coping/Psychosocial   Plan of Care Reviewed With patient --    Coping/Psychosocial   Patient Agreement with Plan of Care --  agrees   Plan of Care Review   Progress --  improving   OTHER   Outcome Summary --  Pt has been pleasant and cooperative this shift. Pt rates anxiety 8/10, depression 10/10, denies SI, HI and hallucinaations. Will continue to monitor.       Problem: Overarching Goals (Adult)  Goal: Adheres to Safety Considerations for Self and Others  Outcome: Ongoing (interventions implemented as appropriate)    Goal: Optimized Coping Skills in Response to Life Stressors  Outcome: Ongoing (interventions implemented as appropriate)    Goal: Develops/Participates in Therapeutic [place] to Support Successful Transition  Outcome: Ongoing (interventions implemented as appropriate)      Problem: Suicidal Behavior (Adult)  Goal: Suicidal Behavior is Absent/Minimized/Managed  Outcome: Ongoing (interventions implemented as appropriate)

## 2018-05-24 NOTE — PROGRESS NOTES
Continued Stay Note   [facility]     Patient Name: Corrine Dumont  MRN: [id number]  Today's Date: 5/24/2018    Admit Date: 5/19/2018          Discharge Plan     Row Name 05/24/18 1437       Plan    Final Discharge Disposition Code 01 - home or self-care    Final Note Pt was discharged per Dr. Ramos's orders.  SW met w/pt to discuss follow-up care; Pt has an appt at [address] on 5/31@8:30 am, ph. [phone].  SW also gave pt a [facility] application, resources for NA meetings and a list of transportation methods pt could utilize.  Pt stated that she was going to live with a close, family friend and was being transported home by this friend.              Discharge Codes    No documentation.       Expected Discharge Date and Time     Expected Discharge Date Expected Discharge Time    May 24, 2018             CINDY Qiu

## 2018-05-25 ENCOUNTER — TELEPHONE (OUTPATIENT)
Dept: PSYCHIATRY | Facility: HOSPITAL | Age: 53
End: 2018-05-25
